# Patient Record
Sex: MALE | Race: WHITE | NOT HISPANIC OR LATINO | Employment: OTHER | URBAN - METROPOLITAN AREA
[De-identification: names, ages, dates, MRNs, and addresses within clinical notes are randomized per-mention and may not be internally consistent; named-entity substitution may affect disease eponyms.]

---

## 2017-06-04 ENCOUNTER — RESOLUTE PROFESSIONAL BILLING HOSPITAL PROF FEE (OUTPATIENT)
Dept: MEDSURG UNIT | Facility: MEDICAL CENTER | Age: 72
End: 2017-06-04
Payer: MEDICARE

## 2017-06-04 ENCOUNTER — APPOINTMENT (OUTPATIENT)
Dept: RADIOLOGY | Facility: MEDICAL CENTER | Age: 72
DRG: 896 | End: 2017-06-04
Attending: EMERGENCY MEDICINE
Payer: MEDICARE

## 2017-06-04 ENCOUNTER — APPOINTMENT (OUTPATIENT)
Dept: RADIOLOGY | Facility: MEDICAL CENTER | Age: 72
DRG: 896 | End: 2017-06-04
Attending: INTERNAL MEDICINE
Payer: MEDICARE

## 2017-06-04 ENCOUNTER — HOSPITAL ENCOUNTER (INPATIENT)
Facility: MEDICAL CENTER | Age: 72
LOS: 2 days | DRG: 896 | End: 2017-06-07
Attending: EMERGENCY MEDICINE | Admitting: INTERNAL MEDICINE
Payer: MEDICARE

## 2017-06-04 DIAGNOSIS — F10.920 ALCOHOL INTOXICATION, UNCOMPLICATED (HCC): ICD-10-CM

## 2017-06-04 DIAGNOSIS — R07.9 CHEST PAIN, UNSPECIFIED TYPE: ICD-10-CM

## 2017-06-04 DIAGNOSIS — F41.9 ANXIETY: ICD-10-CM

## 2017-06-04 DIAGNOSIS — E86.0 DEHYDRATION: ICD-10-CM

## 2017-06-04 LAB
ABO GROUP BLD: NORMAL
ALBUMIN SERPL BCP-MCNC: 4.2 G/DL (ref 3.2–4.9)
ALBUMIN/GLOB SERPL: 1.4 G/DL
ALP SERPL-CCNC: 68 U/L (ref 30–99)
ALT SERPL-CCNC: 15 U/L (ref 2–50)
ANION GAP SERPL CALC-SCNC: 20 MMOL/L (ref 0–11.9)
APPEARANCE UR: CLEAR
AST SERPL-CCNC: 20 U/L (ref 12–45)
BASOPHILS # BLD AUTO: 0.4 % (ref 0–1.8)
BASOPHILS # BLD: 0.08 K/UL (ref 0–0.12)
BILIRUB SERPL-MCNC: 0.6 MG/DL (ref 0.1–1.5)
BILIRUB UR QL STRIP.AUTO: NEGATIVE
BLD GP AB SCN SERPL QL: NORMAL
BUN SERPL-MCNC: 22 MG/DL (ref 8–22)
CALCIUM SERPL-MCNC: 9.2 MG/DL (ref 8.5–10.5)
CHLORIDE SERPL-SCNC: 103 MMOL/L (ref 96–112)
CO2 SERPL-SCNC: 15 MMOL/L (ref 20–33)
COLOR UR: YELLOW
CREAT SERPL-MCNC: 0.99 MG/DL (ref 0.5–1.4)
EKG IMPRESSION: NORMAL
EOSINOPHIL # BLD AUTO: 0 K/UL (ref 0–0.51)
EOSINOPHIL NFR BLD: 0 % (ref 0–6.9)
ERYTHROCYTE [DISTWIDTH] IN BLOOD BY AUTOMATED COUNT: 43.3 FL (ref 35.9–50)
ETHANOL BLD-MCNC: 0.19 G/DL
GFR SERPL CREATININE-BSD FRML MDRD: >60 ML/MIN/1.73 M 2
GLOBULIN SER CALC-MCNC: 3.1 G/DL (ref 1.9–3.5)
GLUCOSE SERPL-MCNC: 148 MG/DL (ref 65–99)
GLUCOSE UR STRIP.AUTO-MCNC: 70 MG/DL
HCT VFR BLD AUTO: 48.7 % (ref 42–52)
HGB BLD-MCNC: 16.5 G/DL (ref 14–18)
IMM GRANULOCYTES # BLD AUTO: 0.13 K/UL (ref 0–0.11)
IMM GRANULOCYTES NFR BLD AUTO: 0.6 % (ref 0–0.9)
KETONES UR STRIP.AUTO-MCNC: 60 MG/DL
LEUKOCYTE ESTERASE UR QL STRIP.AUTO: NEGATIVE
LYMPHOCYTES # BLD AUTO: 3.12 K/UL (ref 1–4.8)
LYMPHOCYTES NFR BLD: 14.6 % (ref 22–41)
MCH RBC QN AUTO: 29 PG (ref 27–33)
MCHC RBC AUTO-ENTMCNC: 33.9 G/DL (ref 33.7–35.3)
MCV RBC AUTO: 85.7 FL (ref 81.4–97.8)
MICRO URNS: ABNORMAL
MONOCYTES # BLD AUTO: 0.97 K/UL (ref 0–0.85)
MONOCYTES NFR BLD AUTO: 4.5 % (ref 0–13.4)
MUCOUS THREADS #/AREA URNS HPF: ABNORMAL /HPF
NEUTROPHILS # BLD AUTO: 17.08 K/UL (ref 1.82–7.42)
NEUTROPHILS NFR BLD: 79.9 % (ref 44–72)
NITRITE UR QL STRIP.AUTO: NEGATIVE
NRBC # BLD AUTO: 0 K/UL
NRBC BLD AUTO-RTO: 0 /100 WBC
PH UR STRIP.AUTO: 5.5 [PH]
PLATELET # BLD AUTO: 407 K/UL (ref 164–446)
PMV BLD AUTO: 9.7 FL (ref 9–12.9)
POTASSIUM SERPL-SCNC: 3.7 MMOL/L (ref 3.6–5.5)
PROT SERPL-MCNC: 7.3 G/DL (ref 6–8.2)
PROT UR QL STRIP: 30 MG/DL
RBC # BLD AUTO: 5.68 M/UL (ref 4.7–6.1)
RBC # URNS HPF: ABNORMAL /HPF
RBC UR QL AUTO: ABNORMAL
RH BLD: NORMAL
SODIUM SERPL-SCNC: 138 MMOL/L (ref 135–145)
SP GR UR STRIP.AUTO: 1.02
TROPONIN I SERPL-MCNC: <0.01 NG/ML (ref 0–0.04)
WBC # BLD AUTO: 21.4 K/UL (ref 4.8–10.8)
WBC #/AREA URNS HPF: ABNORMAL /HPF

## 2017-06-04 PROCEDURE — 84484 ASSAY OF TROPONIN QUANT: CPT

## 2017-06-04 PROCEDURE — 86901 BLOOD TYPING SEROLOGIC RH(D): CPT

## 2017-06-04 PROCEDURE — 0DJ08ZZ INSPECTION OF UPPER INTESTINAL TRACT, VIA NATURAL OR ARTIFICIAL OPENING ENDOSCOPIC: ICD-10-PCS | Performed by: INTERNAL MEDICINE

## 2017-06-04 PROCEDURE — 80053 COMPREHEN METABOLIC PANEL: CPT

## 2017-06-04 PROCEDURE — 94760 N-INVAS EAR/PLS OXIMETRY 1: CPT

## 2017-06-04 PROCEDURE — 86850 RBC ANTIBODY SCREEN: CPT

## 2017-06-04 PROCEDURE — 81001 URINALYSIS AUTO W/SCOPE: CPT

## 2017-06-04 PROCEDURE — 700102 HCHG RX REV CODE 250 W/ 637 OVERRIDE(OP): Performed by: STUDENT IN AN ORGANIZED HEALTH CARE EDUCATION/TRAINING PROGRAM

## 2017-06-04 PROCEDURE — A9270 NON-COVERED ITEM OR SERVICE: HCPCS | Performed by: EMERGENCY MEDICINE

## 2017-06-04 PROCEDURE — 700102 HCHG RX REV CODE 250 W/ 637 OVERRIDE(OP): Performed by: EMERGENCY MEDICINE

## 2017-06-04 PROCEDURE — 304562 HCHG STAT O2 MASK/CANNULA

## 2017-06-04 PROCEDURE — 96374 THER/PROPH/DIAG INJ IV PUSH: CPT

## 2017-06-04 PROCEDURE — 96361 HYDRATE IV INFUSION ADD-ON: CPT

## 2017-06-04 PROCEDURE — 700105 HCHG RX REV CODE 258: Performed by: EMERGENCY MEDICINE

## 2017-06-04 PROCEDURE — HZ2ZZZZ DETOXIFICATION SERVICES FOR SUBSTANCE ABUSE TREATMENT: ICD-10-PCS | Performed by: INTERNAL MEDICINE

## 2017-06-04 PROCEDURE — 700111 HCHG RX REV CODE 636 W/ 250 OVERRIDE (IP): Performed by: EMERGENCY MEDICINE

## 2017-06-04 PROCEDURE — 86900 BLOOD TYPING SEROLOGIC ABO: CPT

## 2017-06-04 PROCEDURE — A9270 NON-COVERED ITEM OR SERVICE: HCPCS | Performed by: STUDENT IN AN ORGANIZED HEALTH CARE EDUCATION/TRAINING PROGRAM

## 2017-06-04 PROCEDURE — 93005 ELECTROCARDIOGRAM TRACING: CPT

## 2017-06-04 PROCEDURE — 85025 COMPLETE CBC W/AUTO DIFF WBC: CPT

## 2017-06-04 PROCEDURE — G0378 HOSPITAL OBSERVATION PER HR: HCPCS

## 2017-06-04 PROCEDURE — 99285 EMERGENCY DEPT VISIT HI MDM: CPT

## 2017-06-04 PROCEDURE — 71010 DX-CHEST-PORTABLE (1 VIEW): CPT

## 2017-06-04 PROCEDURE — 80307 DRUG TEST PRSMV CHEM ANLYZR: CPT

## 2017-06-04 RX ORDER — LORAZEPAM 2 MG/ML
0.5 INJECTION INTRAMUSCULAR EVERY 4 HOURS PRN
Status: DISCONTINUED | OUTPATIENT
Start: 2017-06-04 | End: 2017-06-06

## 2017-06-04 RX ORDER — BISACODYL 10 MG
10 SUPPOSITORY, RECTAL RECTAL
Status: DISCONTINUED | OUTPATIENT
Start: 2017-06-04 | End: 2017-06-05

## 2017-06-04 RX ORDER — FOLIC ACID 1 MG/1
1 TABLET ORAL DAILY
Status: DISCONTINUED | OUTPATIENT
Start: 2017-06-05 | End: 2017-06-07 | Stop reason: HOSPADM

## 2017-06-04 RX ORDER — DOCUSATE SODIUM 100 MG/1
100 CAPSULE, LIQUID FILLED ORAL 2 TIMES DAILY
COMMUNITY

## 2017-06-04 RX ORDER — PYRIDOXINE HCL (VITAMIN B6) 100 MG
65 TABLET ORAL DAILY
Status: DISCONTINUED | OUTPATIENT
Start: 2017-06-05 | End: 2017-06-04

## 2017-06-04 RX ORDER — ALBUTEROL SULFATE 90 UG/1
2 AEROSOL, METERED RESPIRATORY (INHALATION) EVERY 4 HOURS PRN
Status: DISCONTINUED | OUTPATIENT
Start: 2017-06-04 | End: 2017-06-07 | Stop reason: HOSPADM

## 2017-06-04 RX ORDER — PANTOPRAZOLE SODIUM 40 MG/10ML
80 INJECTION, POWDER, LYOPHILIZED, FOR SOLUTION INTRAVENOUS ONCE
Status: DISCONTINUED | OUTPATIENT
Start: 2017-06-04 | End: 2017-06-04

## 2017-06-04 RX ORDER — GABAPENTIN 300 MG/1
300 CAPSULE ORAL 4 TIMES DAILY
Status: DISCONTINUED | OUTPATIENT
Start: 2017-06-04 | End: 2017-06-06

## 2017-06-04 RX ORDER — LORAZEPAM 1 MG/1
2 TABLET ORAL
Status: DISCONTINUED | OUTPATIENT
Start: 2017-06-04 | End: 2017-06-06

## 2017-06-04 RX ORDER — SODIUM CHLORIDE 9 MG/ML
1000 INJECTION, SOLUTION INTRAVENOUS ONCE
Status: COMPLETED | OUTPATIENT
Start: 2017-06-04 | End: 2017-06-04

## 2017-06-04 RX ORDER — CITALOPRAM 40 MG/1
40 TABLET ORAL DAILY
Status: DISCONTINUED | OUTPATIENT
Start: 2017-06-05 | End: 2017-06-05

## 2017-06-04 RX ORDER — LORAZEPAM 2 MG/ML
1.5 INJECTION INTRAMUSCULAR
Status: DISCONTINUED | OUTPATIENT
Start: 2017-06-04 | End: 2017-06-06

## 2017-06-04 RX ORDER — OMEPRAZOLE 20 MG/1
20 CAPSULE, DELAYED RELEASE ORAL
Status: DISCONTINUED | OUTPATIENT
Start: 2017-06-05 | End: 2017-06-04

## 2017-06-04 RX ORDER — LORAZEPAM 2 MG/ML
2 INJECTION INTRAMUSCULAR ONCE
Status: COMPLETED | OUTPATIENT
Start: 2017-06-04 | End: 2017-06-04

## 2017-06-04 RX ORDER — THIAMINE MONONITRATE (VIT B1) 100 MG
100 TABLET ORAL DAILY
Status: DISCONTINUED | OUTPATIENT
Start: 2017-06-05 | End: 2017-06-07 | Stop reason: HOSPADM

## 2017-06-04 RX ORDER — PANTOPRAZOLE SODIUM 40 MG/10ML
40 INJECTION, POWDER, LYOPHILIZED, FOR SOLUTION INTRAVENOUS DAILY
Status: DISCONTINUED | OUTPATIENT
Start: 2017-06-05 | End: 2017-06-05

## 2017-06-04 RX ORDER — NITROGLYCERIN 0.4 MG/1
0.4 TABLET SUBLINGUAL
Status: DISCONTINUED | OUTPATIENT
Start: 2017-06-04 | End: 2017-06-07 | Stop reason: HOSPADM

## 2017-06-04 RX ORDER — SODIUM CHLORIDE 9 MG/ML
INJECTION, SOLUTION INTRAVENOUS CONTINUOUS
Status: DISCONTINUED | OUTPATIENT
Start: 2017-06-04 | End: 2017-06-07 | Stop reason: HOSPADM

## 2017-06-04 RX ORDER — LORAZEPAM 2 MG/ML
2 INJECTION INTRAMUSCULAR
Status: DISCONTINUED | OUTPATIENT
Start: 2017-06-04 | End: 2017-06-06

## 2017-06-04 RX ORDER — LABETALOL HYDROCHLORIDE 5 MG/ML
10 INJECTION, SOLUTION INTRAVENOUS EVERY 4 HOURS PRN
Status: DISCONTINUED | OUTPATIENT
Start: 2017-06-04 | End: 2017-06-07 | Stop reason: HOSPADM

## 2017-06-04 RX ORDER — LORAZEPAM 1 MG/1
1 TABLET ORAL EVERY 4 HOURS PRN
Status: DISCONTINUED | OUTPATIENT
Start: 2017-06-04 | End: 2017-06-06

## 2017-06-04 RX ORDER — LORAZEPAM 1 MG/1
3 TABLET ORAL
Status: DISCONTINUED | OUTPATIENT
Start: 2017-06-04 | End: 2017-06-06

## 2017-06-04 RX ORDER — AMOXICILLIN 250 MG
2 CAPSULE ORAL 2 TIMES DAILY
Status: DISCONTINUED | OUTPATIENT
Start: 2017-06-04 | End: 2017-06-05

## 2017-06-04 RX ORDER — ACETAMINOPHEN 325 MG/1
650 TABLET ORAL EVERY 6 HOURS PRN
Status: DISCONTINUED | OUTPATIENT
Start: 2017-06-04 | End: 2017-06-07 | Stop reason: HOSPADM

## 2017-06-04 RX ORDER — LORAZEPAM 0.5 MG/1
0.5 TABLET ORAL EVERY 4 HOURS PRN
Status: DISCONTINUED | OUTPATIENT
Start: 2017-06-04 | End: 2017-06-06

## 2017-06-04 RX ORDER — ATORVASTATIN CALCIUM 40 MG/1
40 TABLET, FILM COATED ORAL ONCE
Status: DISCONTINUED | OUTPATIENT
Start: 2017-06-04 | End: 2017-06-05

## 2017-06-04 RX ORDER — LORAZEPAM 2 MG/ML
1 INJECTION INTRAMUSCULAR
Status: DISCONTINUED | OUTPATIENT
Start: 2017-06-04 | End: 2017-06-06

## 2017-06-04 RX ORDER — ALBUTEROL SULFATE 90 UG/1
2 AEROSOL, METERED RESPIRATORY (INHALATION) EVERY 4 HOURS PRN
COMMUNITY
End: 2018-06-11

## 2017-06-04 RX ORDER — LORAZEPAM 1 MG/1
4 TABLET ORAL
Status: DISCONTINUED | OUTPATIENT
Start: 2017-06-04 | End: 2017-06-06

## 2017-06-04 RX ORDER — POLYETHYLENE GLYCOL 3350 17 G/17G
1 POWDER, FOR SOLUTION ORAL
Status: DISCONTINUED | OUTPATIENT
Start: 2017-06-04 | End: 2017-06-05

## 2017-06-04 RX ADMIN — SODIUM CHLORIDE 1000 ML: 9 INJECTION, SOLUTION INTRAVENOUS at 20:40

## 2017-06-04 RX ADMIN — LIDOCAINE HYDROCHLORIDE 30 ML: 20 SOLUTION OROPHARYNGEAL at 20:33

## 2017-06-04 RX ADMIN — LORAZEPAM 1 MG: 1 TABLET ORAL at 23:41

## 2017-06-04 RX ADMIN — LORAZEPAM 2 MG: 2 INJECTION INTRAMUSCULAR at 20:33

## 2017-06-04 RX ADMIN — GABAPENTIN 300 MG: 300 CAPSULE ORAL at 23:41

## 2017-06-04 ASSESSMENT — LIFESTYLE VARIABLES
EVER HAD A DRINK FIRST THING IN THE MORNING TO STEADY YOUR NERVES TO GET RID OF A HANGOVER: NO
SUBSTANCE_ABUSE: 0
AGITATION: MODERATELY FIDGETY AND RESTLESS
DO YOU DRINK ALCOHOL: YES
TREMOR: *
VISUAL DISTURBANCES: NOT PRESENT
TOTAL SCORE: 3
CONSUMPTION TOTAL: POSITIVE
NAUSEA AND VOMITING: NO NAUSEA AND NO VOMITING
EVER FELT BAD OR GUILTY ABOUT YOUR DRINKING: YES
HAVE PEOPLE ANNOYED YOU BY CRITICIZING YOUR DRINKING: YES
ORIENTATION AND CLOUDING OF SENSORIUM: ORIENTED AND CAN DO SERIAL ADDITIONS
TOTAL SCORE: 3
DOES PATIENT WANT TO STOP DRINKING: YES
AVERAGE NUMBER OF DAYS PER WEEK YOU HAVE A DRINK CONTAINING ALCOHOL: 3
ON A TYPICAL DAY WHEN YOU DRINK ALCOHOL HOW MANY DRINKS DO YOU HAVE: 5
TOTAL SCORE: 10
DOES PATIENT WANT TO TALK TO SOMEONE ABOUT QUITTING: YES
HEADACHE, FULLNESS IN HEAD: NOT PRESENT
PAROXYSMAL SWEATS: NO SWEAT VISIBLE
HOW MANY TIMES IN THE PAST YEAR HAVE YOU HAD 5 OR MORE DRINKS IN A DAY: 5
TOTAL SCORE: 3
ANXIETY: MODERATELY ANXIOUS OR GUARDED, SO ANXIETY IS INFERRED
HAVE YOU EVER FELT YOU SHOULD CUT DOWN ON YOUR DRINKING: YES
AUDITORY DISTURBANCES: NOT PRESENT

## 2017-06-04 ASSESSMENT — ENCOUNTER SYMPTOMS
ABDOMINAL PAIN: 1
VOMITING: 0
PND: 0
DEPRESSION: 0
CLAUDICATION: 0
MYALGIAS: 0
NAUSEA: 1
PHOTOPHOBIA: 0
SPUTUM PRODUCTION: 0
FEVER: 0
CHILLS: 0
HEMOPTYSIS: 0
BLOOD IN STOOL: 0
COUGH: 0
HEADACHES: 1
BLURRED VISION: 0
BACK PAIN: 1
TINGLING: 0
ORTHOPNEA: 0
NECK PAIN: 0
TREMORS: 0
LOSS OF CONSCIOUSNESS: 0
DOUBLE VISION: 0
SPEECH CHANGE: 0
WEIGHT LOSS: 0
DIZZINESS: 0
SEIZURES: 0
DIARRHEA: 1
PALPITATIONS: 0
CONSTIPATION: 0
HEARTBURN: 1
SHORTNESS OF BREATH: 1
HALLUCINATIONS: 0
SENSORY CHANGE: 0
FOCAL WEAKNESS: 0

## 2017-06-04 ASSESSMENT — PAIN SCALES - GENERAL: PAINLEVEL_OUTOF10: 10

## 2017-06-05 ENCOUNTER — APPOINTMENT (OUTPATIENT)
Dept: RADIOLOGY | Facility: MEDICAL CENTER | Age: 72
DRG: 896 | End: 2017-06-05
Attending: EMERGENCY MEDICINE
Payer: MEDICARE

## 2017-06-05 LAB
ABO GROUP BLD: NORMAL
ALBUMIN SERPL BCP-MCNC: 4.4 G/DL (ref 3.2–4.9)
ALBUMIN/GLOB SERPL: 1.3 G/DL
ALP SERPL-CCNC: 72 U/L (ref 30–99)
ALT SERPL-CCNC: 16 U/L (ref 2–50)
AMPHET UR QL SCN: NEGATIVE
ANION GAP SERPL CALC-SCNC: 15 MMOL/L (ref 0–11.9)
APAP SERPL-MCNC: <10 UG/ML (ref 10–30)
AST SERPL-CCNC: 21 U/L (ref 12–45)
BARBITURATES UR QL SCN: NEGATIVE
BASOPHILS # BLD AUTO: 0.5 % (ref 0–1.8)
BASOPHILS # BLD: 0.09 K/UL (ref 0–0.12)
BENZODIAZ UR QL SCN: NEGATIVE
BILIRUB SERPL-MCNC: 0.8 MG/DL (ref 0.1–1.5)
BUN SERPL-MCNC: 21 MG/DL (ref 8–22)
BZE UR QL SCN: NEGATIVE
C DIFF DNA SPEC QL NAA+PROBE: NEGATIVE
C DIFF TOX A+B STL QL IA: NEGATIVE
C DIFF TOX GENS STL QL NAA+PROBE: NEGATIVE
CALCIUM SERPL-MCNC: 9.2 MG/DL (ref 8.5–10.5)
CANNABINOIDS UR QL SCN: NEGATIVE
CHLORIDE SERPL-SCNC: 104 MMOL/L (ref 96–112)
CO2 SERPL-SCNC: 19 MMOL/L (ref 20–33)
CREAT SERPL-MCNC: 0.81 MG/DL (ref 0.5–1.4)
EKG IMPRESSION: NORMAL
EOSINOPHIL # BLD AUTO: 0.02 K/UL (ref 0–0.51)
EOSINOPHIL NFR BLD: 0.1 % (ref 0–6.9)
ERYTHROCYTE [DISTWIDTH] IN BLOOD BY AUTOMATED COUNT: 44.3 FL (ref 35.9–50)
GFR SERPL CREATININE-BSD FRML MDRD: >60 ML/MIN/1.73 M 2
GLOBULIN SER CALC-MCNC: 3.3 G/DL (ref 1.9–3.5)
GLUCOSE SERPL-MCNC: 80 MG/DL (ref 65–99)
HCT VFR BLD AUTO: 48.6 % (ref 42–52)
HGB BLD-MCNC: 16.5 G/DL (ref 14–18)
IMM GRANULOCYTES # BLD AUTO: 0.06 K/UL (ref 0–0.11)
IMM GRANULOCYTES NFR BLD AUTO: 0.3 % (ref 0–0.9)
INR PPP: 1.05 (ref 0.87–1.13)
LACTATE BLD-SCNC: 1.7 MMOL/L (ref 0.5–2)
LACTATE BLD-SCNC: 5.5 MMOL/L (ref 0.5–2)
LIPASE SERPL-CCNC: 20 U/L (ref 11–82)
LYMPHOCYTES # BLD AUTO: 3.57 K/UL (ref 1–4.8)
LYMPHOCYTES NFR BLD: 19.3 % (ref 22–41)
MAGNESIUM SERPL-MCNC: 1.9 MG/DL (ref 1.5–2.5)
MCH RBC QN AUTO: 29.2 PG (ref 27–33)
MCHC RBC AUTO-ENTMCNC: 34 G/DL (ref 33.7–35.3)
MCV RBC AUTO: 86 FL (ref 81.4–97.8)
MDMA UR QL SCN: NEGATIVE
METHADONE UR QL SCN: NEGATIVE
MONOCYTES # BLD AUTO: 1.13 K/UL (ref 0–0.85)
MONOCYTES NFR BLD AUTO: 6.1 % (ref 0–13.4)
NEUTROPHILS # BLD AUTO: 13.64 K/UL (ref 1.82–7.42)
NEUTROPHILS NFR BLD: 73.7 % (ref 44–72)
NRBC # BLD AUTO: 0 K/UL
NRBC BLD AUTO-RTO: 0 /100 WBC
OPIATES UR QL SCN: NEGATIVE
OXYCODONE UR QL SCN: NEGATIVE
PCP UR QL SCN: NEGATIVE
PLATELET # BLD AUTO: 395 K/UL (ref 164–446)
PMV BLD AUTO: 9.5 FL (ref 9–12.9)
POTASSIUM SERPL-SCNC: 3.9 MMOL/L (ref 3.6–5.5)
PROCALCITONIN SERPL-MCNC: <0.05 NG/ML
PROPOXYPH UR QL SCN: NEGATIVE
PROT SERPL-MCNC: 7.7 G/DL (ref 6–8.2)
PROTHROMBIN TIME: 14 SEC (ref 12–14.6)
RBC # BLD AUTO: 5.65 M/UL (ref 4.7–6.1)
SALICYLATES SERPL-MCNC: 0 MG/DL (ref 15–25)
SODIUM SERPL-SCNC: 138 MMOL/L (ref 135–145)
TROPONIN I SERPL-MCNC: <0.01 NG/ML (ref 0–0.04)
TROPONIN I SERPL-MCNC: <0.01 NG/ML (ref 0–0.04)
WBC # BLD AUTO: 18.5 K/UL (ref 4.8–10.8)
WBC STL QL MICRO: NORMAL

## 2017-06-05 PROCEDURE — 93005 ELECTROCARDIOGRAM TRACING: CPT | Performed by: STUDENT IN AN ORGANIZED HEALTH CARE EDUCATION/TRAINING PROGRAM

## 2017-06-05 PROCEDURE — 83735 ASSAY OF MAGNESIUM: CPT

## 2017-06-05 PROCEDURE — 87324 CLOSTRIDIUM AG IA: CPT

## 2017-06-05 PROCEDURE — 700102 HCHG RX REV CODE 250 W/ 637 OVERRIDE(OP): Performed by: STUDENT IN AN ORGANIZED HEALTH CARE EDUCATION/TRAINING PROGRAM

## 2017-06-05 PROCEDURE — 770020 HCHG ROOM/CARE - TELE (206)

## 2017-06-05 PROCEDURE — 84145 PROCALCITONIN (PCT): CPT

## 2017-06-05 PROCEDURE — A9270 NON-COVERED ITEM OR SERVICE: HCPCS | Performed by: STUDENT IN AN ORGANIZED HEALTH CARE EDUCATION/TRAINING PROGRAM

## 2017-06-05 PROCEDURE — 700101 HCHG RX REV CODE 250: Performed by: INTERNAL MEDICINE

## 2017-06-05 PROCEDURE — 36415 COLL VENOUS BLD VENIPUNCTURE: CPT

## 2017-06-05 PROCEDURE — 85025 COMPLETE CBC W/AUTO DIFF WBC: CPT

## 2017-06-05 PROCEDURE — 87493 C DIFF AMPLIFIED PROBE: CPT

## 2017-06-05 PROCEDURE — 700101 HCHG RX REV CODE 250: Performed by: STUDENT IN AN ORGANIZED HEALTH CARE EDUCATION/TRAINING PROGRAM

## 2017-06-05 PROCEDURE — 80307 DRUG TEST PRSMV CHEM ANLYZR: CPT

## 2017-06-05 PROCEDURE — 87040 BLOOD CULTURE FOR BACTERIA: CPT | Mod: 91

## 2017-06-05 PROCEDURE — 700111 HCHG RX REV CODE 636 W/ 250 OVERRIDE (IP): Performed by: STUDENT IN AN ORGANIZED HEALTH CARE EDUCATION/TRAINING PROGRAM

## 2017-06-05 PROCEDURE — 84484 ASSAY OF TROPONIN QUANT: CPT

## 2017-06-05 PROCEDURE — A9270 NON-COVERED ITEM OR SERVICE: HCPCS | Performed by: INTERNAL MEDICINE

## 2017-06-05 PROCEDURE — 85610 PROTHROMBIN TIME: CPT

## 2017-06-05 PROCEDURE — 700102 HCHG RX REV CODE 250 W/ 637 OVERRIDE(OP): Performed by: INTERNAL MEDICINE

## 2017-06-05 PROCEDURE — 74177 CT ABD & PELVIS W/CONTRAST: CPT

## 2017-06-05 PROCEDURE — 99223 1ST HOSP IP/OBS HIGH 75: CPT | Mod: AI,GC | Performed by: INTERNAL MEDICINE

## 2017-06-05 PROCEDURE — 89055 LEUKOCYTE ASSESSMENT FECAL: CPT

## 2017-06-05 PROCEDURE — 83630 LACTOFERRIN FECAL (QUAL): CPT

## 2017-06-05 PROCEDURE — C9113 INJ PANTOPRAZOLE SODIUM, VIA: HCPCS | Performed by: STUDENT IN AN ORGANIZED HEALTH CARE EDUCATION/TRAINING PROGRAM

## 2017-06-05 PROCEDURE — 700111 HCHG RX REV CODE 636 W/ 250 OVERRIDE (IP): Performed by: INTERNAL MEDICINE

## 2017-06-05 PROCEDURE — 83605 ASSAY OF LACTIC ACID: CPT

## 2017-06-05 PROCEDURE — 93010 ELECTROCARDIOGRAM REPORT: CPT | Performed by: INTERNAL MEDICINE

## 2017-06-05 PROCEDURE — 700105 HCHG RX REV CODE 258: Performed by: INTERNAL MEDICINE

## 2017-06-05 PROCEDURE — 700117 HCHG RX CONTRAST REV CODE 255: Performed by: EMERGENCY MEDICINE

## 2017-06-05 PROCEDURE — 94760 N-INVAS EAR/PLS OXIMETRY 1: CPT

## 2017-06-05 PROCEDURE — 83690 ASSAY OF LIPASE: CPT

## 2017-06-05 PROCEDURE — 700105 HCHG RX REV CODE 258: Performed by: STUDENT IN AN ORGANIZED HEALTH CARE EDUCATION/TRAINING PROGRAM

## 2017-06-05 PROCEDURE — 80053 COMPREHEN METABOLIC PANEL: CPT

## 2017-06-05 RX ORDER — HYDROCODONE BITARTRATE AND ACETAMINOPHEN 10; 325 MG/1; MG/1
1 TABLET ORAL EVERY 6 HOURS PRN
Status: DISCONTINUED | OUTPATIENT
Start: 2017-06-05 | End: 2017-06-07 | Stop reason: HOSPADM

## 2017-06-05 RX ORDER — OMEPRAZOLE 20 MG/1
20 CAPSULE, DELAYED RELEASE ORAL DAILY
Status: DISCONTINUED | OUTPATIENT
Start: 2017-06-05 | End: 2017-06-06

## 2017-06-05 RX ORDER — QUETIAPINE FUMARATE 25 MG/1
25 TABLET, FILM COATED ORAL 3 TIMES DAILY
Status: DISCONTINUED | OUTPATIENT
Start: 2017-06-05 | End: 2017-06-06

## 2017-06-05 RX ORDER — SODIUM CHLORIDE 9 MG/ML
500 INJECTION, SOLUTION INTRAVENOUS ONCE
Status: COMPLETED | OUTPATIENT
Start: 2017-06-05 | End: 2017-06-05

## 2017-06-05 RX ORDER — QUETIAPINE FUMARATE 25 MG/1
25 TABLET, FILM COATED ORAL 3 TIMES DAILY
Status: DISCONTINUED | OUTPATIENT
Start: 2017-06-05 | End: 2017-06-05

## 2017-06-05 RX ADMIN — LORAZEPAM 1.5 MG: 2 INJECTION INTRAMUSCULAR at 10:08

## 2017-06-05 RX ADMIN — LORAZEPAM 0.5 MG: 0.5 TABLET ORAL at 04:46

## 2017-06-05 RX ADMIN — ACETAMINOPHEN 650 MG: 325 TABLET, FILM COATED ORAL at 10:01

## 2017-06-05 RX ADMIN — CITALOPRAM HYDROBROMIDE 40 MG: 40 TABLET ORAL at 08:05

## 2017-06-05 RX ADMIN — PANTOPRAZOLE SODIUM 40 MG: 40 INJECTION, POWDER, FOR SOLUTION INTRAVENOUS at 11:21

## 2017-06-05 RX ADMIN — LORAZEPAM 1 MG: 1 TABLET ORAL at 22:38

## 2017-06-05 RX ADMIN — GABAPENTIN 300 MG: 300 CAPSULE ORAL at 18:15

## 2017-06-05 RX ADMIN — SODIUM CHLORIDE: 9 INJECTION, SOLUTION INTRAVENOUS at 01:59

## 2017-06-05 RX ADMIN — LORAZEPAM 1 MG: 2 INJECTION INTRAMUSCULAR at 20:18

## 2017-06-05 RX ADMIN — LORAZEPAM 1 MG: 2 INJECTION INTRAMUSCULAR at 08:07

## 2017-06-05 RX ADMIN — SODIUM CHLORIDE: 9 INJECTION, SOLUTION INTRAVENOUS at 13:19

## 2017-06-05 RX ADMIN — SODIUM CHLORIDE: 9 INJECTION, SOLUTION INTRAVENOUS at 21:21

## 2017-06-05 RX ADMIN — METRONIDAZOLE 500 MG: 500 INJECTION, SOLUTION INTRAVENOUS at 04:41

## 2017-06-05 RX ADMIN — CEFTRIAXONE SODIUM 2 G: 2 INJECTION, POWDER, FOR SOLUTION INTRAMUSCULAR; INTRAVENOUS at 04:20

## 2017-06-05 RX ADMIN — GABAPENTIN 300 MG: 300 CAPSULE ORAL at 22:39

## 2017-06-05 RX ADMIN — LORAZEPAM 1 MG: 2 INJECTION INTRAMUSCULAR at 13:12

## 2017-06-05 RX ADMIN — IOHEXOL 100 ML: 350 INJECTION, SOLUTION INTRAVENOUS at 00:59

## 2017-06-05 RX ADMIN — HYDROCODONE BITARTRATE AND ACETAMINOPHEN 1 TABLET: 10; 325 TABLET ORAL at 18:16

## 2017-06-05 RX ADMIN — OMEPRAZOLE 20 MG: 20 CAPSULE, DELAYED RELEASE ORAL at 15:38

## 2017-06-05 RX ADMIN — SODIUM CHLORIDE 80 MG: 9 INJECTION, SOLUTION INTRAVENOUS at 02:07

## 2017-06-05 RX ADMIN — QUETIAPINE FUMARATE 25 MG: 25 TABLET ORAL at 22:30

## 2017-06-05 RX ADMIN — FOLIC ACID: 5 INJECTION, SOLUTION INTRAMUSCULAR; INTRAVENOUS; SUBCUTANEOUS at 04:06

## 2017-06-05 RX ADMIN — LORAZEPAM 1 MG: 2 INJECTION INTRAMUSCULAR at 15:38

## 2017-06-05 RX ADMIN — GABAPENTIN 300 MG: 300 CAPSULE ORAL at 04:20

## 2017-06-05 RX ADMIN — Medication 100 MG: at 22:39

## 2017-06-05 RX ADMIN — THERA TABS 1 TABLET: TAB at 22:39

## 2017-06-05 RX ADMIN — LORAZEPAM 1 MG: 2 INJECTION INTRAMUSCULAR at 18:15

## 2017-06-05 RX ADMIN — HYDROCODONE BITARTRATE AND ACETAMINOPHEN 1 TABLET: 10; 325 TABLET ORAL at 11:21

## 2017-06-05 RX ADMIN — FOLIC ACID 1 MG: 1 TABLET ORAL at 22:39

## 2017-06-05 RX ADMIN — GABAPENTIN 300 MG: 300 CAPSULE ORAL at 11:21

## 2017-06-05 RX ADMIN — LORAZEPAM: 2 INJECTION INTRAMUSCULAR; INTRAVENOUS at 20:38

## 2017-06-05 RX ADMIN — SODIUM CHLORIDE 500 ML: 9 INJECTION, SOLUTION INTRAVENOUS at 02:10

## 2017-06-05 ASSESSMENT — LIFESTYLE VARIABLES
TOTAL SCORE: 12
NAUSEA AND VOMITING: NO NAUSEA AND NO VOMITING
PAROXYSMAL SWEATS: BARELY PERCEPTIBLE SWEATING. PALMS MOIST
NAUSEA AND VOMITING: NO NAUSEA AND NO VOMITING
TREMOR: *
HEADACHE, FULLNESS IN HEAD: NOT PRESENT
VISUAL DISTURBANCES: NOT PRESENT
PAROXYSMAL SWEATS: *
VISUAL DISTURBANCES: NOT PRESENT
VISUAL DISTURBANCES: NOT PRESENT
PAROXYSMAL SWEATS: NO SWEAT VISIBLE
ANXIETY: *
PAROXYSMAL SWEATS: BARELY PERCEPTIBLE SWEATING. PALMS MOIST
NAUSEA AND VOMITING: MILD NAUSEA WITH NO VOMITING
TREMOR: TREMOR NOT VISIBLE BUT CAN BE FELT, FINGERTIP TO FINGERTIP
PAROXYSMAL SWEATS: NO SWEAT VISIBLE
HEADACHE, FULLNESS IN HEAD: NOT PRESENT
TOTAL SCORE: 3
AUDITORY DISTURBANCES: NOT PRESENT
ANXIETY: MILDLY ANXIOUS
TOTAL SCORE: 11
EVER_SMOKED: YES
ORIENTATION AND CLOUDING OF SENSORIUM: CANNOT DO SERIAL ADDITIONS OR IS UNCERTAIN ABOUT DATE
AUDITORY DISTURBANCES: NOT PRESENT
NAUSEA AND VOMITING: NO NAUSEA AND NO VOMITING
NAUSEA AND VOMITING: NO NAUSEA AND NO VOMITING
AGITATION: MODERATELY FIDGETY AND RESTLESS
PAROXYSMAL SWEATS: BARELY PERCEPTIBLE SWEATING. PALMS MOIST
TREMOR: *
TREMOR: TREMOR NOT VISIBLE BUT CAN BE FELT, FINGERTIP TO FINGERTIP
NAUSEA AND VOMITING: NO NAUSEA AND NO VOMITING
VISUAL DISTURBANCES: NOT PRESENT
NAUSEA AND VOMITING: NO NAUSEA AND NO VOMITING
ANXIETY: *
ORIENTATION AND CLOUDING OF SENSORIUM: ORIENTED AND CAN DO SERIAL ADDITIONS
HEADACHE, FULLNESS IN HEAD: NOT PRESENT
TOTAL SCORE: 9
HEADACHE, FULLNESS IN HEAD: NOT PRESENT
TOTAL SCORE: 5
HEADACHE, FULLNESS IN HEAD: NOT PRESENT
AGITATION: MODERATELY FIDGETY AND RESTLESS
ORIENTATION AND CLOUDING OF SENSORIUM: ORIENTED AND CAN DO SERIAL ADDITIONS
AGITATION: SOMEWHAT MORE THAN NORMAL ACTIVITY
AUDITORY DISTURBANCES: NOT PRESENT
AUDITORY DISTURBANCES: NOT PRESENT
TOTAL SCORE: 16
ORIENTATION AND CLOUDING OF SENSORIUM: CANNOT DO SERIAL ADDITIONS OR IS UNCERTAIN ABOUT DATE
TREMOR: TREMOR NOT VISIBLE BUT CAN BE FELT, FINGERTIP TO FINGERTIP
VISUAL DISTURBANCES: NOT PRESENT
VISUAL DISTURBANCES: NOT PRESENT
AUDITORY DISTURBANCES: NOT PRESENT
AGITATION: MODERATELY FIDGETY AND RESTLESS
VISUAL DISTURBANCES: NOT PRESENT
HEADACHE, FULLNESS IN HEAD: NOT PRESENT
PAROXYSMAL SWEATS: NO SWEAT VISIBLE
TREMOR: *
ORIENTATION AND CLOUDING OF SENSORIUM: ORIENTED AND CAN DO SERIAL ADDITIONS
ORIENTATION AND CLOUDING OF SENSORIUM: ORIENTED AND CAN DO SERIAL ADDITIONS
TREMOR: *
ANXIETY: *
VISUAL DISTURBANCES: NOT PRESENT
AUDITORY DISTURBANCES: NOT PRESENT
TOTAL SCORE: 3
TACTILE DISTURBANCES: MODERATE ITCHING, PINS AND NEEDLES SENSATION, BURNING OR NUMBNESS
VISUAL DISTURBANCES: NOT PRESENT
ANXIETY: *
PAROXYSMAL SWEATS: BARELY PERCEPTIBLE SWEATING. PALMS MOIST
TOTAL SCORE: 11
ORIENTATION AND CLOUDING OF SENSORIUM: CANNOT DO SERIAL ADDITIONS OR IS UNCERTAIN ABOUT DATE
ANXIETY: MODERATELY ANXIOUS OR GUARDED, SO ANXIETY IS INFERRED
HEADACHE, FULLNESS IN HEAD: NOT PRESENT
HEADACHE, FULLNESS IN HEAD: NOT PRESENT
TOTAL SCORE: 11
HEADACHE, FULLNESS IN HEAD: NOT PRESENT
ANXIETY: MODERATELY ANXIOUS OR GUARDED, SO ANXIETY IS INFERRED
NAUSEA AND VOMITING: NO NAUSEA AND NO VOMITING
AUDITORY DISTURBANCES: NOT PRESENT
ORIENTATION AND CLOUDING OF SENSORIUM: ORIENTED AND CAN DO SERIAL ADDITIONS
EVER_SMOKED: YES
TREMOR: *
AGITATION: NORMAL ACTIVITY
AGITATION: MODERATELY FIDGETY AND RESTLESS
AUDITORY DISTURBANCES: NOT PRESENT
ANXIETY: MODERATELY ANXIOUS OR GUARDED, SO ANXIETY IS INFERRED
AGITATION: SOMEWHAT MORE THAN NORMAL ACTIVITY
ORIENTATION AND CLOUDING OF SENSORIUM: ORIENTED AND CAN DO SERIAL ADDITIONS
TREMOR: *
AGITATION: NORMAL ACTIVITY
PAROXYSMAL SWEATS: BARELY PERCEPTIBLE SWEATING. PALMS MOIST
NAUSEA AND VOMITING: NO NAUSEA AND NO VOMITING
ANXIETY: NO ANXIETY (AT EASE)
AUDITORY DISTURBANCES: NOT PRESENT
AGITATION: MODERATELY FIDGETY AND RESTLESS

## 2017-06-05 ASSESSMENT — ENCOUNTER SYMPTOMS
FOCAL WEAKNESS: 0
HEARTBURN: 1
SHORTNESS OF BREATH: 0
PSYCHIATRIC NEGATIVE: 1
WHEEZING: 0
VOMITING: 0
EYE PAIN: 0
BLURRED VISION: 0
COUGH: 0
SENSORY CHANGE: 0
FEVER: 0
CONSTIPATION: 0
DEPRESSION: 0
EYE REDNESS: 0
SORE THROAT: 0
NAUSEA: 0
DIZZINESS: 0
HEADACHES: 0
BACK PAIN: 1
PALPITATIONS: 0
CHILLS: 0
SEIZURES: 0
DIARRHEA: 1

## 2017-06-05 ASSESSMENT — COPD QUESTIONNAIRES
HAVE YOU SMOKED AT LEAST 100 CIGARETTES IN YOUR ENTIRE LIFE: YES
DO YOU EVER COUGH UP ANY MUCUS OR PHLEGM?: NO/ONLY WITH OCCASIONAL COLDS OR INFECTIONS
COPD SCREENING SCORE: 4
DURING THE PAST 4 WEEKS HOW MUCH DID YOU FEEL SHORT OF BREATH: NONE/LITTLE OF THE TIME

## 2017-06-05 ASSESSMENT — PAIN SCALES - GENERAL
PAINLEVEL_OUTOF10: 5
PAINLEVEL_OUTOF10: 0
PAINLEVEL_OUTOF10: 5

## 2017-06-05 NOTE — PROGRESS NOTES
2 RN skin assessment on admission done with warren; both ears, elbows, abdomen, back and heels intact.  Sacrum intact with reddened inner buttocks extending to gluteal minimus redness with barrier cream applied; and also right foot ingrown toenail with black eschar with pictures taken and will upload on patient's chart.

## 2017-06-05 NOTE — CARE PLAN
Problem: Nutritional:  Goal: Achieve adequate nutritional intake  Patient will consume 50% of meals  Outcome: NOT MET  R/t NPO

## 2017-06-05 NOTE — ASSESSMENT & PLAN NOTE
- Roughly 2-3 days prior to admission, consistent with prior ETOH binge periods  - Afebrile, procalcitonin < .05, C. Diff (-), NO hx of recent hospitalization, or abx use  - Likely non-bacterial and assc with his ETOH abuse  - Discont CTX/Flagyl; cont to closely monitor clinically

## 2017-06-05 NOTE — PROGRESS NOTES
Bedside report received. Patient A&O X 3, tele- SR,  3L NC,  Voids- Urinal, Activity- x1 assist, Diet- NPO. Complains of pain 5/10 medicated per MAR. POC discussed with patient. Pt verbalized understanding. Call light and belongings with in reach.  Bed locked and in lowest position, alarm and fall precautions in place.

## 2017-06-05 NOTE — ASSESSMENT & PLAN NOTE
- Anion gap on admission 20 >> 15 today  - Improving with continued IV hydration  - Likely ETOH related >> cont IVF hydration

## 2017-06-05 NOTE — PROGRESS NOTES
Patent 4 bottles of medications with temazepam empty bottle, citalopram, gabapentin and a bottle with one pill taken to pharmacy per policy.

## 2017-06-05 NOTE — ASSESSMENT & PLAN NOTE
- Patient presented on admission with non-specific abd pain worsened with laying flat  - CT abd/pel showed large hiatal hernia vs paraesophageal w/ aspect of non-obstructing volvulus  - Consulted GI (Dr. Stewart) >> 6/6 EGD Grade D severe reflux esophagitis from, several flat red spots in the ulceration but no bleeding.  Unable to assess Lawson's due to degree of inflammation, Giant hiatal hernia from 31-45cm, Duodenum WNL.  - No current intervention, per note from GI, pt to f/u as outpt in later July for gastroscopy

## 2017-06-05 NOTE — ED NOTES
Emily from Lab called with critical result of lactic at 5.5. Critical lab result read back to emily. Primary nurse made aware

## 2017-06-05 NOTE — ASSESSMENT & PLAN NOTE
- Long standing 20 year hx of routine binge drinking, +withdrawal, hx of intubation, NO seizures  - Current admission was s/p 2-3 days of heavy vodka abuse, BIB by EMS   - Admission labs: ETOH .49, Lactate 5.5, Utox (-), Na 34, K 3.7, AG 20 >> 15, BUN 22, Crt .99  - Seizure/Aspiration/Fall precauations, Full liquid diet  - IVF, CIWA, PO MV/Folate/Thiamine, PO Omeprazole 40mg QD

## 2017-06-05 NOTE — ASSESSMENT & PLAN NOTE
- Appears patient is more anxious as opposed to having true ETOH withdrawals   - Cont Citalopram; holding Temazepam while patient is on CIWA  - Psych (Dr. Santiago) consulted >> pending recs

## 2017-06-05 NOTE — PROGRESS NOTES
Patient received from ED on Tele accompanied by transporter; drowsy easily awakened; incontinent of urine and stool' Patient answers questions appropriately while awake; arrived with both hearing aids, a pair of shoes, sweat pants, red t shirt and a black duffle bag. He says he has contact lenses in. Patient has 2 peripheral IV sites in both antecubital.  IV of NS initiated at 125 ml/hr. Inner buttock reddened ; right foot 4th toe with ingrown toe nail. Moving all 4 extremities equally strong.

## 2017-06-05 NOTE — ED NOTES
Transport to nurses station.  Pt currently in CT.  Ticket to ride signed.  Pt belongings removed to room by transport to be taken to unit.

## 2017-06-05 NOTE — ED NOTES
Pt resting in bed.  VS stable.  NAD noted.  Respirations even and unlabored.  No questions or concerns to address at this time.
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
## 2017-06-05 NOTE — SENIOR ADMIT NOTE
Mr. Espinosa is a 71 y.o. male with PMHx of alcoholism, anxiety, GERD, Lawson's esophagus, COPD, Hx of GI bleed?, chronic back pain who presented with alcohol intoxication onset two days. Patient is a poor historian. Patient was sent to hospital because drinking too much ETOH. He has been binge drinking 1 quart of vodka for the past two days. Last drink earlier today. He c/o new onset chest pain, lower chest, pressure like, onset at rest when he was sleeping, worse with eating and taking deep breath, lasted hours, no alleviating factors. Chest pain is not exertional. He also has nausea, lower abdominal pain, intermittent, worse with eating lasting for hours. He has diarrhea. Recent hx of black stool last time was yesterday which he had 3-5 x of black stool. He also has headache.   He denies fever, chills, cough, vomiting, heartburn, constipation.  Per patient, hx of intubation for ETOH withdrawal. He denies hx of seizure.    Exam:  /75 mmHg  Pulse 92  Resp 18  Ht 1.829 m (6')  Wt 90.266 kg (199 lb)  BMI 26.98 kg/m2  SpO2 100%    Physical Exam   Constitutional: He is oriented to person, place, and time and well-developed, well-nourished, and in no distress. No distress.   HENT:   Head: Normocephalic and atraumatic.   Denture. Hearing aids   Eyes: EOM are normal. Pupils are equal, round, and reactive to light.   Pulmonary/Chest:   Decreased breath sounds bilaterally   Abdominal: Soft. He exhibits no distension and no mass. There is tenderness. There is no rebound and no guarding.   Lower abdominal tenderness.    Genitourinary: Guaiac positive stool.   Musculoskeletal: He exhibits no edema.   Neurological: He is alert and oriented to person, place, and time. No cranial nerve deficit.   Skin: Skin is warm and dry. No rash noted. He is not diaphoretic. No erythema. No pallor.   Psychiatric: Mood, memory, affect and judgment normal.       Labs:  Recent Results (from the past 24 hour(s))   CBC w/ Differential     Collection Time: 06/04/17  8:12 PM   Result Value Ref Range    WBC 21.4 (H) 4.8 - 10.8 K/uL    RBC 5.68 4.70 - 6.10 M/uL    Hemoglobin 16.5 14.0 - 18.0 g/dL    Hematocrit 48.7 42.0 - 52.0 %    MCV 85.7 81.4 - 97.8 fL    MCH 29.0 27.0 - 33.0 pg    MCHC 33.9 33.7 - 35.3 g/dL    RDW 43.3 35.9 - 50.0 fL    Platelet Count 407 164 - 446 K/uL    MPV 9.7 9.0 - 12.9 fL    Neutrophils-Polys 79.90 (H) 44.00 - 72.00 %    Lymphocytes 14.60 (L) 22.00 - 41.00 %    Monocytes 4.50 0.00 - 13.40 %    Eosinophils 0.00 0.00 - 6.90 %    Basophils 0.40 0.00 - 1.80 %    Immature Granulocytes 0.60 0.00 - 0.90 %    Nucleated RBC 0.00 /100 WBC    Neutrophils (Absolute) 17.08 (H) 1.82 - 7.42 K/uL    Lymphs (Absolute) 3.12 1.00 - 4.80 K/uL    Monos (Absolute) 0.97 (H) 0.00 - 0.85 K/uL    Eos (Absolute) 0.00 0.00 - 0.51 K/uL    Baso (Absolute) 0.08 0.00 - 0.12 K/uL    Immature Granulocytes (abs) 0.13 (H) 0.00 - 0.11 K/uL    NRBC (Absolute) 0.00 K/uL   Complete Metabolic Panel (CMP)    Collection Time: 06/04/17  8:12 PM   Result Value Ref Range    Sodium 138 135 - 145 mmol/L    Potassium 3.7 3.6 - 5.5 mmol/L    Chloride 103 96 - 112 mmol/L    Co2 15 (L) 20 - 33 mmol/L    Anion Gap 20.0 (H) 0.0 - 11.9    Glucose 148 (H) 65 - 99 mg/dL    Bun 22 8 - 22 mg/dL    Creatinine 0.99 0.50 - 1.40 mg/dL    Calcium 9.2 8.5 - 10.5 mg/dL    AST(SGOT) 20 12 - 45 U/L    ALT(SGPT) 15 2 - 50 U/L    Alkaline Phosphatase 68 30 - 99 U/L    Total Bilirubin 0.6 0.1 - 1.5 mg/dL    Albumin 4.2 3.2 - 4.9 g/dL    Total Protein 7.3 6.0 - 8.2 g/dL    Globulin 3.1 1.9 - 3.5 g/dL    A-G Ratio 1.4 g/dL   Troponin STAT    Collection Time: 06/04/17  8:12 PM   Result Value Ref Range    Troponin I <0.01 0.00 - 0.04 ng/mL   DIAGNOSTIC ALCOHOL    Collection Time: 06/04/17  8:12 PM   Result Value Ref Range    Diagnostic Alcohol 0.19 (H) 0.00 g/dL   ESTIMATED GFR    Collection Time: 06/04/17  8:12 PM   Result Value Ref Range    GFR If African American >60 >60 mL/min/1.73 m 2    GFR  If Non African American >60 >60 mL/min/1.73 m 2       DX-CHEST-PORTABLE (1 VIEW)   Final Result      1.  There is minimal perihilar and lower lobe atelectasis.          Assessment and Plan:  # ETOH intoxication: CIWA with ativan prn. Rally bag, multivitamins, thiamine, folate. IV fluid. Fall/seizure/aspiration precaution    # Leukocytosis, nausea, lower abdominal pain, diarrhea, lactic acidosis.   # Diffuse colitis versus diffuse colonic submucosal edema?  # Large hiatal hernia or paraesophageal hernia with possible volvulus component but no evidence of obstruction or dilated stomach.   - no fever/chills, cough, dysuria  - lipase wnl  - abdominal/pelvic CT: large hiatal hernia or paraesophageal hernia with possible volvulus component but no evidence of obstruction or dilated stomach. diffuse colonic submucosal edema possibly related to hypoproteinemia versus diffuse colitis.mild hepatic fatty infiltration.  plan  - IV ceftriaxone and flagyl to cover probable GI source of infection  - PPI  - IV fluid   - trend lactic acid  - stool studies  - blood cultures, procalcitonin pending  - consider GI/surgery consult  if indicated    # Chest pain: trend troponin and EKGs. Aspirin, statin, nitroglycerine prn SL. NPO after midnight. Check AM lipid profile. Consider MPI if indicated. PPI for GERD/hiatal hernia    # FOBT+ in ER, Hx melena, GERD, Lawson's: Vitals/Hb stable, IV PPI , cross and screen, NPO after midnight, 2 large bore IV accesses, IV fluid, trend H&H. Consider GI consult    #AGMA: lactic acidosis, urine drug screen:neg, salicylate level negative and tylenol level negative. IV fluid. Supportive.     #Chronic back pain: tylenol prn. Hold home narcotic for now.  # anxiety: no SI/HI. citalopram    Full code  SCD for DVT prophylaxis    For further details of Assessment & Plan, please refer to Dr. Collazo's H&P.
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
## 2017-06-05 NOTE — DIETARY
NUTRITION SERVICES - Potential newly identified wound. Wound team consult not yet pending, will await wound staging to make recommendations. RD will monitor per dept policy.

## 2017-06-05 NOTE — ED NOTES
Called unit to give report, told Rafa that nurse has not been answering calls.  Collette, RN paged to phone.  Attempted to give Collette, RN report and hospitalist call this RN for pt lab results.

## 2017-06-05 NOTE — H&P
[facility] [place] Internal Medicine Admitting History and Physical    Name Trace Espinosa 1945   Age/Sex 71 y.o. male   MRN [id number]   Code Status Full     After 5PM or if no immediate response to page, please call for cross-coverage  Attending/Team: Mendel/Ganesh Call [phone] to page   1st Call - Day Intern (R1):   Adam 2nd Call - Day Sr. Resident (R2/R3):   John       Chief Complaint:      HPI:  70 yo male with PMH of anxiety, Lawson's esophagus, lumbago, COPD, presented to the ED for acute alcohol intoxication. Patient was a poor historian, and was acutely intoxicated during the interview. Patient was brought in by EMS (called by his wife), after his wife reported that he took her wallet, and went on a 2 day alcohol binge. Patient reported drinking a quart of 80 proof over the last two days (ED physician reported 2 liters, and 2 pints) with last drink early in the afternoon on same day of admission. He reports that he was intubated prior for alcohol withdrawals, but denies any alcohol related seizures. Patient also reported an episode of dull, substernal chest pain which began while he was lying down in the morning, and lasted all day. He reports that his chest pain is worse with touch/respiration/food intake, but is not associated with physical activity. Patient also endorsed a 2 day history of non-radiating,  lower abdominal pain (described as pressure), with multiple episodes of black diarrhea (he admits to iron supplementation). His abdominal pain is described as coming and going, and is worsened by food intake. Patient denied changes in shortness of breath, fevers, chills, dysuria, productive cough, vomiting, hematemesis, bright red stool. Patient also reported that prior to this episode, he was sober for a few weeks. Patient also has a history of chronic back pain for which he takes daily Norco. Patient denied excess use, but patient's wife reports that he took 7 pills, rather than his  usual 2.     Vitals: HR 92, RR 18, /75, 98% on 3L  Labs: WBC 21.4, Hg 16.4, Plts 407, Etoh .19, Bun/Cre 22/.99, Trops .01  Imaging: CXR: minimal perihilar and lower lobe atelectasis     Review of Systems   Constitutional: Negative for fever, chills, weight loss and malaise/fatigue.   Eyes: Negative for blurred vision, double vision and photophobia.   Respiratory: Positive for shortness of breath. Negative for cough, hemoptysis and sputum production.    Cardiovascular: Positive for chest pain. Negative for palpitations, orthopnea, claudication, leg swelling and PND.   Gastrointestinal: Positive for heartburn, nausea, abdominal pain and diarrhea. Negative for vomiting, constipation, blood in stool and melena.   Genitourinary: Negative for dysuria, urgency and frequency.   Musculoskeletal: Positive for back pain. Negative for myalgias and neck pain.   Skin: Negative for itching and rash.   Neurological: Positive for headaches. Negative for dizziness, tingling, tremors, sensory change, speech change, focal weakness, seizures and loss of consciousness.   Psychiatric/Behavioral: Negative for depression, suicidal ideas, hallucinations and substance abuse.             Past Medical History:   Past Medical History   Diagnosis Date   • Back pain    • Acid reflux    • Sleep disorder    • Pleural effusion    • Pneumonia        Past Surgical History:  Past Surgical History   Procedure Laterality Date   • Other orthopedic surgery       knee surgery   • Other       vasectomy       Current Outpatient Medications:  Home Medications     Reviewed by Barbara Hernandez (Pharmacy Tech) on 06/04/17 at 2231  Med List Status: Complete    Medication Last Dose Status    albuterol 108 (90 BASE) MCG/ACT Aero Soln inhalation aerosol 6/2/2017 Active    citalopram (CELEXA) 40 MG Tab 6/2/2017 Active    docusate sodium (COLACE) 100 MG Cap 6/2/2017 Active    esomeprazole (NEXIUM) 40 MG delayed-release capsule 6/2/2017 Active    Ferrous Fumarate  (IRON) 18 MG Tab CR 6/2/2017 Active    gabapentin (NEURONTIN) 300 MG Cap 6/4/2017 Active    hydrocodone/acetaminophen (NORCO)  MG Tab 6/2/2017 Active    magnesium oxide (MAG-OX) 400 MG Tab 6/2/2017 Active    Multiple Vitamins-Minerals (CENTRUM ADULTS PO) 6/2/2017 Active    temazepam (RESTORIL) 15 MG Cap 6/2/2017 Active    thiamine (THIAMINE) 100 MG Tab 6/2/2017 Active                Medication Allergy/Sensitivities:  No Known Allergies      Family History:  No family history on file.    Social History:  Social History     Social History   • Marital Status:      Spouse Name: N/A   • Number of Children: N/A   • Years of Education: N/A     Occupational History   • Not on file.     Social History Main Topics   • Smoking status: Never Smoker    • Smokeless tobacco: Not on file   • Alcohol Use: Yes   • Drug Use: No   • Sexual Activity: Not on file     Other Topics Concern   • Not on file     Social History Narrative   • No narrative on file     Living situation: Lives with wife  PCP : GAYATHRI Han  Denies current tobacco use (heavy smoker but quit 30+ yrs ago),  Drug use  Usually drinks a pint a day with periodic binges, and breaks      Physical Exam     Filed Vitals:    06/04/17 2100 06/04/17 2130 06/04/17 2200 06/04/17 2230   BP: 145/72 133/71 115/80 130/87   Pulse: 89 98 90 95   Resp: 18 18 18 22   Height:       Weight:       SpO2: 98% 99% 98% 99%     Body mass index is 26.98 kg/(m^2).  /87 mmHg  Pulse 95  Resp 22  Ht 1.829 m (6')  Wt 90.266 kg (199 lb)  BMI 26.98 kg/m2  SpO2 99%  O2 therapy: Pulse Oximetry: 99 %, O2 (LPM): 3, O2 Delivery: Nasal Cannula    Physical Exam   Constitutional: He is oriented to person, place, and time and well-developed, well-nourished, and in no distress. No distress.   High anxiety, intoxicated   HENT:   Head: Normocephalic and atraumatic.   Neck: Normal range of motion. Neck supple.   Cardiovascular: Normal rate, regular rhythm, normal heart sounds and  intact distal pulses.  Exam reveals no gallop and no friction rub.    No murmur heard.  Pulmonary/Chest: Effort normal and breath sounds normal. No respiratory distress. He has no wheezes. He has no rales. He exhibits no tenderness.   Abdominal: Soft. Bowel sounds are normal. He exhibits no distension. There is tenderness. There is no rebound and no guarding.   Musculoskeletal: Normal range of motion. He exhibits no edema or tenderness.   Neurological: He is alert and oriented to person, place, and time. No cranial nerve deficit.   Skin: Skin is warm and dry. He is not diaphoretic.   Psychiatric: Mood, memory, affect and judgment normal.   Intoxicated             Data Review       Old Records Request:   Completed  Current Records review and summary: Completed    Lab Data Review:  Recent Results (from the past 24 hour(s))   CBC w/ Differential    Collection Time: 06/04/17  8:12 PM   Result Value Ref Range    WBC 21.4 (H) 4.8 - 10.8 K/uL    RBC 5.68 4.70 - 6.10 M/uL    Hemoglobin 16.5 14.0 - 18.0 g/dL    Hematocrit 48.7 42.0 - 52.0 %    MCV 85.7 81.4 - 97.8 fL    MCH 29.0 27.0 - 33.0 pg    MCHC 33.9 33.7 - 35.3 g/dL    RDW 43.3 35.9 - 50.0 fL    Platelet Count 407 164 - 446 K/uL    MPV 9.7 9.0 - 12.9 fL    Neutrophils-Polys 79.90 (H) 44.00 - 72.00 %    Lymphocytes 14.60 (L) 22.00 - 41.00 %    Monocytes 4.50 0.00 - 13.40 %    Eosinophils 0.00 0.00 - 6.90 %    Basophils 0.40 0.00 - 1.80 %    Immature Granulocytes 0.60 0.00 - 0.90 %    Nucleated RBC 0.00 /100 WBC    Neutrophils (Absolute) 17.08 (H) 1.82 - 7.42 K/uL    Lymphs (Absolute) 3.12 1.00 - 4.80 K/uL    Monos (Absolute) 0.97 (H) 0.00 - 0.85 K/uL    Eos (Absolute) 0.00 0.00 - 0.51 K/uL    Baso (Absolute) 0.08 0.00 - 0.12 K/uL    Immature Granulocytes (abs) 0.13 (H) 0.00 - 0.11 K/uL    NRBC (Absolute) 0.00 K/uL   Complete Metabolic Panel (CMP)    Collection Time: 06/04/17  8:12 PM   Result Value Ref Range    Sodium 138 135 - 145 mmol/L    Potassium 3.7 3.6 - 5.5  mmol/L    Chloride 103 96 - 112 mmol/L    Co2 15 (L) 20 - 33 mmol/L    Anion Gap 20.0 (H) 0.0 - 11.9    Glucose 148 (H) 65 - 99 mg/dL    Bun 22 8 - 22 mg/dL    Creatinine 0.99 0.50 - 1.40 mg/dL    Calcium 9.2 8.5 - 10.5 mg/dL    AST(SGOT) 20 12 - 45 U/L    ALT(SGPT) 15 2 - 50 U/L    Alkaline Phosphatase 68 30 - 99 U/L    Total Bilirubin 0.6 0.1 - 1.5 mg/dL    Albumin 4.2 3.2 - 4.9 g/dL    Total Protein 7.3 6.0 - 8.2 g/dL    Globulin 3.1 1.9 - 3.5 g/dL    A-G Ratio 1.4 g/dL   Troponin STAT    Collection Time: 06/04/17  8:12 PM   Result Value Ref Range    Troponin I <0.01 0.00 - 0.04 ng/mL   DIAGNOSTIC ALCOHOL    Collection Time: 06/04/17  8:12 PM   Result Value Ref Range    Diagnostic Alcohol 0.19 (H) 0.00 g/dL   ESTIMATED GFR    Collection Time: 06/04/17  8:12 PM   Result Value Ref Range    GFR If African American >60 >60 mL/min/1.73 m 2    GFR If Non African American >60 >60 mL/min/1.73 m 2       Imaging/Procedures Review:    ndependant Imaging Review: Completed  DX-CHEST-PORTABLE (1 VIEW)   Final Result      1.  There is minimal perihilar and lower lobe atelectasis.      CT-ABDOMEN-PELVIS WITH & W/O    (Results Pending)            EKG:   EKG Independant Review: Completed  QTc:468, HR: 93, Normal Sinus Rhythm, no ST/T changes     (Yes) Records reviewed and summarized in current documentation             Assessment/Plan     Assessment & plan notes cannot be loaded without a specified hospital service.    # Chest pain:  - Atypical  - Initial EKG showed no concerning ST changs  - Initial Troponin negative  - Heart Score 3  - Trend troponin and EKG's  - Gave one time 325 Aspirin  - Gave one time 40 Atorvastatin  - Nitroglycerine prn   - NPO after midnight, Day team to consider echo/stress test  - Lipid profile pending    # Alcohol intoxication:   - Patient placed on CIWA  - Rally bag, with IV fluids to be started upon completion  - Multivitamins, thiamine, folate  - Patient placed on Fall/seizure/aspiration  precaution    # Leukocytosis:  - No fever/chills, cough, dysuria, or overt symptomatology  - No consolidation seen on CXR  - U/A pending  - No indication for antibiotics at this time  - CTM      # Abdominal Pain  - Likely related to alcohol abuse  - TTP on exam, no guarding, no rigidity  - Pending Lipase, Tylenol level pending, Salicylate level pending  - Pending lactic acid  - Abdominal/pelvic CT showed changes consistent with diffuse colitis-> placed patient on C3 + metronidazole, ordered Cdiff      # FOBT+  - Likely related to upper GI bleed from alcohol  - History of melena, GERD, Lawson's  - Vitals stable  - Hg stable  - IV Pantoprazole (loading 80 mg, than 40mg BID)  - Type and Screen, 2 large bore IV accesses  - NPO after midnight    #AGMA:   - AG 20, low Bicarb  - likely related to alcohol intoxication  - lactic acid pending  - urine drug screen pending  -  IV fluid after completion of    #Chronic back pain:   - Tylenol for pain  - Hold home narcotic for now as patient is acutely intoxicated    # anxiety:  - Denies SI/HI  - Continue citalopram  - Holding temazepam as patient is acutely intoxicated and on CIWA    Anticipated Hospital stay: Observation admit        Quality Measures  EKG reviewed, Radiology images reviewed, Labs reviewed and Medications reviewed  Dnoaldson catheter: No Donaldson        DVT prophylaxis - mechanical: SCDs

## 2017-06-05 NOTE — ASSESSMENT & PLAN NOTE
- WBC on admission 21.4 >> 18 >> 10.4 today  - Etiology does not seem to be infectious >> pt afebrile, w/o other clinical symptoms  - Afebrile, Pro-calcitonin < .05, C. Diff (-)  - Discontinue CTX/Flagyl, 6/5/20117  - Colitis likely related to inflammation 2/2 ETOH abuse

## 2017-06-05 NOTE — ED NOTES
Pt moved to room 24 for safety.  Pt resting in bed.  Less anxious.  VS stable.  NAD noted.  Respirations even and unlabored.  No questions or concerns to address at this time.

## 2017-06-05 NOTE — ASSESSMENT & PLAN NOTE
- FIT test (+) on admission w/ hx of dark tarry stools  - Very likely 2/2 chronic ETOH abuse  - Consulted GI (Dr. Stewart) for further eval >> 6/6 EGD showed no over bleeds  - Plans to f/u with GI in later July, per pt's wife

## 2017-06-05 NOTE — PROGRESS NOTES
Report received as patient arrived to unit via stretcher on tele and oxygen; drowsy but awakened easily, moving all extremities and following simple commands.Monitor shows NSR and patient has 2 patent saline lock in each antecubital site. Patient noted to have bilateral hearing aids, a black duffle bag, red T shirt and sweat pants. Also noted red inner buttocks and right foot 4th toe ingrown nail.

## 2017-06-05 NOTE — PROGRESS NOTES
[facility] [place] Internal Medicine Interval Note    Name Trace Espinosa       1945   Age/Sex 71 y.o. male   MRN [id number]   Code Status FULL     After 5PM or if no immediate response to page, please call for cross-coverage  Attending/Team: Dr. Andersen/Mendel Call [phone] to page   1st Call - Day Intern (R1):   Dr. Walter 2nd Call - Day Sr. Resident (R2/R3):   Dr. Lopez         Chief complaint/ reason for interval visit (Primary Diagnosis)   Diarrhea in setting of acute ETOH intoxication    Interval Problem Daily Status Update    - Alcohol intoxication: ETOH on admission .19, stable, on PO MV/Folate/Thiamine, CIWA  - Hiatal hernia: Stable, non-obstructive, GI consulted (Dr. Stewart)   - Melena: Likely small UGIB 2/2 ETOH gastritis, Hgb 16.5 stable, GI consulted (Dr. Stewart)  - Leukocytosis: 21.4 on admission, improving with IVF, C. diff neg, likely reactive, discont abx  - Diarrhea: C. Diff negative, cont to monitor, IVF; discontinue abx   - Increased anion gap metabolic acidosis: Likely 2/2 ETOH, on IVF, improving  - Anxiety: Stable, cont Citalopram  - Back pain: Stable, cont Norco Q6    Resolved Problems:    Chest pain: Negative cardiac work-up, likely 2/2 GERD vs hiatal hernia    Lower abdominal pain: CT shows diffuse colonic colitis, likely ETOH, C. diff negative      Review of Systems   Constitutional: Negative for fever and chills.   HENT: Negative for congestion and sore throat.    Eyes: Negative for blurred vision, pain and redness.   Respiratory: Negative for cough, shortness of breath and wheezing.    Cardiovascular: Positive for chest pain. Negative for palpitations and leg swelling.        Reproducible CP on palpation   Gastrointestinal: Positive for heartburn, diarrhea and melena. Negative for nausea, vomiting and constipation.   Genitourinary: Negative for dysuria, urgency, frequency and hematuria.   Musculoskeletal: Positive for back pain.        Chronic lower back pain    Skin: Negative for rash.   Neurological: Negative for dizziness, sensory change, focal weakness, seizures and headaches.   Psychiatric/Behavioral: Negative.  Negative for depression and suicidal ideas.       Consultants/Specialty  GI (Dr. Stewart/[facility])    Disposition  Inpatient for acute ETOH intoxication complicated by diarrhea and hiatal hernia seen on CT abd/pel    Quality Measures  Core Measures  EKG/labs reviewed: Yes  Abx: CTX/Flagyl (discontinued 6/5/2017)  DVT prophylaxis: SCD (+FIT, hx of melena)  GI prophylaxis: PO omeprazole  Donaldson: None  Tubes: None  Lines: PIV      Physical Exam       Filed Vitals:    06/05/17 0225 06/05/17 0400 06/05/17 0754 06/05/17 1235   BP:  149/77 134/66 134/67   Pulse: 101 88 74 70   Temp:  37.4 °C (99.4 °F) 36.6 °C (97.9 °F) 36.6 °C (97.9 °F)   Resp: 18 16 19 19   Height:       Weight:       SpO2: 97% 98% 97% 97%     Body mass index is 25.5 kg/(m^2). Weight: 85.3 kg (188 lb 0.8 oz)  Oxygen Therapy:  Pulse Oximetry: 97 %, O2 (LPM): 3, O2 Delivery: Silicone Nasal Cannula    Physical Exam   Constitutional: He is oriented to person, place, and time and well-developed, well-nourished, and in no distress. No distress.   HENT:   Head: Normocephalic and atraumatic.   Eyes: EOM are normal. Pupils are equal, round, and reactive to light.   Neck: Normal range of motion.   Cardiovascular: Normal rate and regular rhythm.    No murmur heard.  Pulmonary/Chest: Effort normal and breath sounds normal. No respiratory distress. He has no wheezes. He exhibits tenderness.   TTP of the anterior chest wall   Abdominal: Soft. Bowel sounds are normal. He exhibits no distension. There is no tenderness. There is no guarding.   Musculoskeletal: Normal range of motion. He exhibits no edema or tenderness.   Neurological: He is alert and oriented to person, place, and time. No cranial nerve deficit. GCS score is 15.   Skin: Skin is warm and dry. He is not diaphoretic. No erythema.         Lab Data  Review:      6/5/2017  1:30 PM    Recent Labs      06/04/17 2012 06/05/17   0001  06/05/17   0036   SODIUM  138  138   --    POTASSIUM  3.7  3.9   --    CHLORIDE  103  104   --    CO2  15*  19*   --    BUN  22  21   --    CREATININE  0.99  0.81   --    MAGNESIUM   --    --   1.9   CALCIUM  9.2  9.2   --        Recent Labs      06/04/17 2012 06/05/17 0001 06/05/17   0036   ALTSGPT  15  16   --    ASTSGOT  20  21   --    ALKPHOSPHAT  68  72   --    TBILIRUBIN  0.6  0.8   --    LIPASE   --    --   20   GLUCOSE  148*  80   --        Recent Labs      06/04/17 2012 06/05/17 0001 06/05/17   0144   RBC  5.68  5.65   --    HEMOGLOBIN  16.5  16.5   --    HEMATOCRIT  48.7  48.6   --    PLATELETCT  407  395   --    PROTHROMBTM   --    --   14.0   INR   --    --   1.05       Recent Labs      06/04/17 2012 06/05/17   0001   WBC  21.4*  18.5*   NEUTSPOLYS  79.90*  73.70*   LYMPHOCYTES  14.60*  19.30*   MONOCYTES  4.50  6.10   EOSINOPHILS  0.00  0.10   BASOPHILS  0.40  0.50   ASTSGOT  20  21   ALTSGPT  15  16   ALKPHOSPHAT  68  72   TBILIRUBIN  0.6  0.8           Assessment/Plan     * Alcohol intoxication  Assessment & Plan  - Long standing 20 year hx of routine binge drinking, +withdrawal, hx of intubation, NO seizures  - Current admission was s/p 2-3 days of heavy vodka abuse, BIB by EMS   - Admission labs: ETOH .49, Lactate 5.5, Utox (-), Na 34, K 3.7, AG 20 >> 15, BUN 22, Crt .99  - Seizure/Aspiration/Fall precauations, Clear liquid diet  - Admit to tele, IVF, CIWA, PO MV/Folate/Thiamine, PO Omeprazole 20mg QD    Hiatal hernia  Assessment & Plan  - Patient presented on admission with non-specific abd pain worsened with laying flat  - CT abd/pel showed large hiatal hernia vs paraesophageal w/ aspect of non-obstructing volvulus  - Consulted GI (Dr. Stewart) for evaluation     Leukocytosis  Assessment & Plan  - WBC on admission 21.4 >> improving, 18 today  - Etiology does not seem to be infectious >> pt afebrile,  w/o other clinical symptoms  - Afebrile, Pro-calcitonin < .05, C. Diff (-)  - Discontinue CTX/Flagyl, 6/5/20117  - Colitis likely related to inflammation 2/2 ETOH abuse    Melena  Assessment & Plan  - FIT test (+) on admission w/ hx of dark tarry stools  - Very likely 2/2 chronic ETOH abuse  - Consulted GI (Dr. Stewart) for further eval >> will likely benefit from EGD    Diarrhea  Assessment & Plan  - Roughly 2-3 days prior to admission, consistent with prior ETOH binge periods  - Afebrile, procalcitonin < .05, C. Diff (-), NO hx of recent hospitalization, or abx use  - Likely non-bacterial and assc with his ETOH abuse  - Discont CTX/Flagyl; cont to closely monitor clinically    Increased anion gap metabolic acidosis  Assessment & Plan  - Anion gap on admission 20 >> 15 today  - Improving with continued IV hydration  - Likely ETOH related >> cont IVF hydration    Anxiety  Assessment & Plan  - Stable; Cont Citalopram; holding Temazepam while patient is on CIWA    Back pain  Assessment & Plan  -Stable; Cont Norco 10/325 Q6 PRN

## 2017-06-06 LAB
ANION GAP SERPL CALC-SCNC: 7 MMOL/L (ref 0–11.9)
BASOPHILS # BLD AUTO: 0.4 % (ref 0–1.8)
BASOPHILS # BLD: 0.04 K/UL (ref 0–0.12)
BUN SERPL-MCNC: 14 MG/DL (ref 8–22)
CALCIUM SERPL-MCNC: 8.4 MG/DL (ref 8.5–10.5)
CHLORIDE SERPL-SCNC: 110 MMOL/L (ref 96–112)
CHOLEST SERPL-MCNC: 198 MG/DL (ref 100–199)
CO2 SERPL-SCNC: 21 MMOL/L (ref 20–33)
CREAT SERPL-MCNC: 0.71 MG/DL (ref 0.5–1.4)
EKG IMPRESSION: NORMAL
EOSINOPHIL # BLD AUTO: 0.02 K/UL (ref 0–0.51)
EOSINOPHIL NFR BLD: 0.2 % (ref 0–6.9)
ERYTHROCYTE [DISTWIDTH] IN BLOOD BY AUTOMATED COUNT: 45.9 FL (ref 35.9–50)
GFR SERPL CREATININE-BSD FRML MDRD: >60 ML/MIN/1.73 M 2
GLUCOSE SERPL-MCNC: 110 MG/DL (ref 65–99)
HCT VFR BLD AUTO: 39.2 % (ref 42–52)
HDLC SERPL-MCNC: 53 MG/DL
HGB BLD-MCNC: 12.8 G/DL (ref 14–18)
IMM GRANULOCYTES # BLD AUTO: 0.04 K/UL (ref 0–0.11)
IMM GRANULOCYTES NFR BLD AUTO: 0.4 % (ref 0–0.9)
LACTOFERRIN STL QL IA: NEGATIVE
LDLC SERPL CALC-MCNC: 112 MG/DL
LYMPHOCYTES # BLD AUTO: 2.53 K/UL (ref 1–4.8)
LYMPHOCYTES NFR BLD: 24.4 % (ref 22–41)
MAGNESIUM SERPL-MCNC: 2 MG/DL (ref 1.5–2.5)
MCH RBC QN AUTO: 29.5 PG (ref 27–33)
MCHC RBC AUTO-ENTMCNC: 33.3 G/DL (ref 33.7–35.3)
MCV RBC AUTO: 88.5 FL (ref 81.4–97.8)
MONOCYTES # BLD AUTO: 0.89 K/UL (ref 0–0.85)
MONOCYTES NFR BLD AUTO: 8.6 % (ref 0–13.4)
NEUTROPHILS # BLD AUTO: 6.86 K/UL (ref 1.82–7.42)
NEUTROPHILS NFR BLD: 66 % (ref 44–72)
NRBC # BLD AUTO: 0 K/UL
NRBC BLD AUTO-RTO: 0 /100 WBC
PLATELET # BLD AUTO: 266 K/UL (ref 164–446)
PMV BLD AUTO: 9.7 FL (ref 9–12.9)
POTASSIUM SERPL-SCNC: 3.8 MMOL/L (ref 3.6–5.5)
RBC # BLD AUTO: 4.34 M/UL (ref 4.7–6.1)
SODIUM SERPL-SCNC: 138 MMOL/L (ref 135–145)
TRIGL SERPL-MCNC: 165 MG/DL (ref 0–149)
WBC # BLD AUTO: 10.4 K/UL (ref 4.8–10.8)

## 2017-06-06 PROCEDURE — 700105 HCHG RX REV CODE 258: Performed by: INTERNAL MEDICINE

## 2017-06-06 PROCEDURE — 160203 HCHG ENDO MINUTES - 1ST 30 MINS LEVEL 4: Performed by: INTERNAL MEDICINE

## 2017-06-06 PROCEDURE — 700111 HCHG RX REV CODE 636 W/ 250 OVERRIDE (IP): Performed by: STUDENT IN AN ORGANIZED HEALTH CARE EDUCATION/TRAINING PROGRAM

## 2017-06-06 PROCEDURE — 99233 SBSQ HOSP IP/OBS HIGH 50: CPT | Mod: GC | Performed by: INTERNAL MEDICINE

## 2017-06-06 PROCEDURE — 700111 HCHG RX REV CODE 636 W/ 250 OVERRIDE (IP)

## 2017-06-06 PROCEDURE — 93005 ELECTROCARDIOGRAM TRACING: CPT | Performed by: STUDENT IN AN ORGANIZED HEALTH CARE EDUCATION/TRAINING PROGRAM

## 2017-06-06 PROCEDURE — 160035 HCHG PACU - 1ST 60 MINS PHASE I: Performed by: INTERNAL MEDICINE

## 2017-06-06 PROCEDURE — A9270 NON-COVERED ITEM OR SERVICE: HCPCS | Performed by: STUDENT IN AN ORGANIZED HEALTH CARE EDUCATION/TRAINING PROGRAM

## 2017-06-06 PROCEDURE — 700102 HCHG RX REV CODE 250 W/ 637 OVERRIDE(OP): Performed by: INTERNAL MEDICINE

## 2017-06-06 PROCEDURE — 160009 HCHG ANES TIME/MIN: Performed by: INTERNAL MEDICINE

## 2017-06-06 PROCEDURE — 93010 ELECTROCARDIOGRAM REPORT: CPT | Performed by: INTERNAL MEDICINE

## 2017-06-06 PROCEDURE — 80048 BASIC METABOLIC PNL TOTAL CA: CPT

## 2017-06-06 PROCEDURE — 700102 HCHG RX REV CODE 250 W/ 637 OVERRIDE(OP): Performed by: STUDENT IN AN ORGANIZED HEALTH CARE EDUCATION/TRAINING PROGRAM

## 2017-06-06 PROCEDURE — 160048 HCHG OR STATISTICAL LEVEL 1-5: Performed by: INTERNAL MEDICINE

## 2017-06-06 PROCEDURE — 83735 ASSAY OF MAGNESIUM: CPT

## 2017-06-06 PROCEDURE — 160002 HCHG RECOVERY MINUTES (STAT): Performed by: INTERNAL MEDICINE

## 2017-06-06 PROCEDURE — 160036 HCHG PACU - EA ADDL 30 MINS PHASE I: Performed by: INTERNAL MEDICINE

## 2017-06-06 PROCEDURE — A9270 NON-COVERED ITEM OR SERVICE: HCPCS | Performed by: INTERNAL MEDICINE

## 2017-06-06 PROCEDURE — 500066 HCHG BITE BLOCK, ECT: Performed by: INTERNAL MEDICINE

## 2017-06-06 PROCEDURE — A9270 NON-COVERED ITEM OR SERVICE: HCPCS | Performed by: PSYCHIATRY & NEUROLOGY

## 2017-06-06 PROCEDURE — 502240 HCHG MISC OR SUPPLY RC 0272: Performed by: INTERNAL MEDICINE

## 2017-06-06 PROCEDURE — 85025 COMPLETE CBC W/AUTO DIFF WBC: CPT

## 2017-06-06 PROCEDURE — 770006 HCHG ROOM/CARE - MED/SURG/GYN SEMI*

## 2017-06-06 PROCEDURE — 36415 COLL VENOUS BLD VENIPUNCTURE: CPT

## 2017-06-06 PROCEDURE — 700102 HCHG RX REV CODE 250 W/ 637 OVERRIDE(OP): Performed by: PSYCHIATRY & NEUROLOGY

## 2017-06-06 PROCEDURE — 80061 LIPID PANEL: CPT

## 2017-06-06 RX ORDER — DIPHENHYDRAMINE HCL 25 MG
50 TABLET ORAL NIGHTLY PRN
Status: DISCONTINUED | OUTPATIENT
Start: 2017-06-06 | End: 2017-06-07 | Stop reason: HOSPADM

## 2017-06-06 RX ORDER — OMEPRAZOLE 20 MG/1
40 CAPSULE, DELAYED RELEASE ORAL DAILY
Status: DISCONTINUED | OUTPATIENT
Start: 2017-06-07 | End: 2017-06-07

## 2017-06-06 RX ORDER — QUETIAPINE FUMARATE 25 MG/1
25 TABLET, FILM COATED ORAL 3 TIMES DAILY
Status: DISCONTINUED | OUTPATIENT
Start: 2017-06-07 | End: 2017-06-07 | Stop reason: HOSPADM

## 2017-06-06 RX ORDER — GABAPENTIN 300 MG/1
300 CAPSULE ORAL 4 TIMES DAILY
Status: DISCONTINUED | OUTPATIENT
Start: 2017-06-06 | End: 2017-06-07 | Stop reason: HOSPADM

## 2017-06-06 RX ORDER — QUETIAPINE FUMARATE 25 MG/1
25 TABLET, FILM COATED ORAL 3 TIMES DAILY
Status: DISCONTINUED | OUTPATIENT
Start: 2017-06-06 | End: 2017-06-06

## 2017-06-06 RX ORDER — QUETIAPINE FUMARATE 25 MG/1
100 TABLET, FILM COATED ORAL EVERY EVENING
Status: DISCONTINUED | OUTPATIENT
Start: 2017-06-06 | End: 2017-06-07 | Stop reason: HOSPADM

## 2017-06-06 RX ADMIN — HYDROCODONE BITARTRATE AND ACETAMINOPHEN 1 TABLET: 10; 325 TABLET ORAL at 10:57

## 2017-06-06 RX ADMIN — GABAPENTIN 300 MG: 300 CAPSULE ORAL at 16:32

## 2017-06-06 RX ADMIN — HYDROCODONE BITARTRATE AND ACETAMINOPHEN 1 TABLET: 10; 325 TABLET ORAL at 23:02

## 2017-06-06 RX ADMIN — GABAPENTIN 300 MG: 300 CAPSULE ORAL at 03:43

## 2017-06-06 RX ADMIN — LORAZEPAM 0.5 MG: 0.5 TABLET ORAL at 13:00

## 2017-06-06 RX ADMIN — LORAZEPAM 0.5 MG: 0.5 TABLET ORAL at 16:32

## 2017-06-06 RX ADMIN — HYDROCODONE BITARTRATE AND ACETAMINOPHEN 1 TABLET: 10; 325 TABLET ORAL at 16:31

## 2017-06-06 RX ADMIN — GABAPENTIN 300 MG: 300 CAPSULE ORAL at 22:58

## 2017-06-06 RX ADMIN — SODIUM CHLORIDE: 9 INJECTION, SOLUTION INTRAVENOUS at 04:38

## 2017-06-06 RX ADMIN — QUETIAPINE FUMARATE 25 MG: 25 TABLET ORAL at 10:57

## 2017-06-06 RX ADMIN — QUETIAPINE FUMARATE 25 MG: 25 TABLET ORAL at 16:31

## 2017-06-06 RX ADMIN — DIPHENHYDRAMINE HCL 50 MG: 25 TABLET ORAL at 22:44

## 2017-06-06 RX ADMIN — HYDROCODONE BITARTRATE AND ACETAMINOPHEN 1 TABLET: 10; 325 TABLET ORAL at 03:39

## 2017-06-06 RX ADMIN — GABAPENTIN 300 MG: 300 CAPSULE ORAL at 10:57

## 2017-06-06 RX ADMIN — QUETIAPINE FUMARATE 100 MG: 25 TABLET ORAL at 20:56

## 2017-06-06 RX ADMIN — ENOXAPARIN SODIUM 40 MG: 100 INJECTION SUBCUTANEOUS at 13:00

## 2017-06-06 RX ADMIN — LORAZEPAM 0.5 MG: 0.5 TABLET ORAL at 06:08

## 2017-06-06 ASSESSMENT — LIFESTYLE VARIABLES
PAROXYSMAL SWEATS: NO SWEAT VISIBLE
HEADACHE, FULLNESS IN HEAD: VERY MILD
AUDITORY DISTURBANCES: NOT PRESENT
PAROXYSMAL SWEATS: NO SWEAT VISIBLE
TOTAL SCORE: 4
NAUSEA AND VOMITING: NO NAUSEA AND NO VOMITING
AUDITORY DISTURBANCES: NOT PRESENT
HEADACHE, FULLNESS IN HEAD: NOT PRESENT
ORIENTATION AND CLOUDING OF SENSORIUM: ORIENTED AND CAN DO SERIAL ADDITIONS
HEADACHE, FULLNESS IN HEAD: VERY MILD
PAROXYSMAL SWEATS: NO SWEAT VISIBLE
ORIENTATION AND CLOUDING OF SENSORIUM: ORIENTED AND CAN DO SERIAL ADDITIONS
TOTAL SCORE: 4
NAUSEA AND VOMITING: NO NAUSEA AND NO VOMITING
VISUAL DISTURBANCES: NOT PRESENT
VISUAL DISTURBANCES: NOT PRESENT
HEADACHE, FULLNESS IN HEAD: MILD
ANXIETY: MILDLY ANXIOUS
HEADACHE, FULLNESS IN HEAD: MILD
TOTAL SCORE: 3
TREMOR: *
AGITATION: SOMEWHAT MORE THAN NORMAL ACTIVITY
ORIENTATION AND CLOUDING OF SENSORIUM: ORIENTED AND CAN DO SERIAL ADDITIONS
ANXIETY: *
ORIENTATION AND CLOUDING OF SENSORIUM: ORIENTED AND CAN DO SERIAL ADDITIONS
ORIENTATION AND CLOUDING OF SENSORIUM: CANNOT DO SERIAL ADDITIONS OR IS UNCERTAIN ABOUT DATE
AUDITORY DISTURBANCES: NOT PRESENT
TOTAL SCORE: 4
TREMOR: TREMOR NOT VISIBLE BUT CAN BE FELT, FINGERTIP TO FINGERTIP
TREMOR: TREMOR NOT VISIBLE BUT CAN BE FELT, FINGERTIP TO FINGERTIP
AUDITORY DISTURBANCES: NOT PRESENT
TOTAL SCORE: 7
ANXIETY: MILDLY ANXIOUS
AGITATION: NORMAL ACTIVITY
NAUSEA AND VOMITING: NO NAUSEA AND NO VOMITING
VISUAL DISTURBANCES: NOT PRESENT
TOTAL SCORE: 4
ANXIETY: *
PAROXYSMAL SWEATS: NO SWEAT VISIBLE
AUDITORY DISTURBANCES: NOT PRESENT
AUDITORY DISTURBANCES: NOT PRESENT
ANXIETY: *
AGITATION: NORMAL ACTIVITY
ORIENTATION AND CLOUDING OF SENSORIUM: ORIENTED AND CAN DO SERIAL ADDITIONS
VISUAL DISTURBANCES: NOT PRESENT
AGITATION: NORMAL ACTIVITY
PAROXYSMAL SWEATS: NO SWEAT VISIBLE
NAUSEA AND VOMITING: NO NAUSEA AND NO VOMITING
VISUAL DISTURBANCES: NOT PRESENT
TREMOR: TREMOR NOT VISIBLE BUT CAN BE FELT, FINGERTIP TO FINGERTIP
AGITATION: NORMAL ACTIVITY
AGITATION: NORMAL ACTIVITY
VISUAL DISTURBANCES: NOT PRESENT
NAUSEA AND VOMITING: NO NAUSEA AND NO VOMITING
TREMOR: TREMOR NOT VISIBLE BUT CAN BE FELT, FINGERTIP TO FINGERTIP
NAUSEA AND VOMITING: NO NAUSEA AND NO VOMITING
PAROXYSMAL SWEATS: NO SWEAT VISIBLE
TREMOR: TREMOR NOT VISIBLE BUT CAN BE FELT, FINGERTIP TO FINGERTIP
HEADACHE, FULLNESS IN HEAD: NOT PRESENT
ANXIETY: *

## 2017-06-06 ASSESSMENT — ENCOUNTER SYMPTOMS
BACK PAIN: 1
DIZZINESS: 0
CONSTIPATION: 0
SHORTNESS OF BREATH: 0
PSYCHIATRIC NEGATIVE: 1
EYE REDNESS: 0
SORE THROAT: 0
SEIZURES: 0
SENSORY CHANGE: 0
DEPRESSION: 0
CHILLS: 0
NAUSEA: 0
VOMITING: 0
PALPITATIONS: 0
FEVER: 0
COUGH: 0
HEARTBURN: 1
EYE PAIN: 0
DIARRHEA: 1
WHEEZING: 0
FOCAL WEAKNESS: 0
HEADACHES: 0
BLURRED VISION: 0

## 2017-06-06 ASSESSMENT — PAIN SCALES - GENERAL
PAINLEVEL_OUTOF10: 8
PAINLEVEL_OUTOF10: 0
PAINLEVEL_OUTOF10: 0

## 2017-06-06 NOTE — PROGRESS NOTES
Bedside report received. Patient A&O X 3, tele- SR,  2L NC,  Voids- urinal/ incontinent , Activity- x1, Diet- NPO. Complains of pain 2/10 medicated per MAR. POC discussed with patient. Pt verbalized understanding. Call light and belongings with in reach.  Bed locked and in lowest position, alarm and fall precautions in place. Possible Scope/per GI

## 2017-06-06 NOTE — CONSULTS
DATE OF SERVICE:  06/05/2017    REFERRING PHYSICIAN:  Dr. Soha Hollingsworth.    CONSULTING PHYSICIAN:  Dejan Stewart MD    CHIEF COMPLAINT:  Abdominal pain, possible melena and abnormal CT scan of the   abdomen and pelvis.    HISTORY OF PRESENT ILLNESS:  This patient is a 71-year-old white male who was   admitted for acute alcohol intoxication.  He has a history of binge drinking,   drinking up to a fifth of vodka a day.  He has over the past couple of weeks,   problems with intermittent episodes of bilateral lower quadrant cramping   abdominal pain that seemed to be brought on by eating.  He has felt nauseated   on occasion, but has not vomited.  He denies any blood in his stool that is   bright red in character, but he has had black tarry bowel movements as an   outpatient, and were found to be Hemoccult positive in the hospital, but the   patient was taking iron supplements prior to admission.  He denies use of   aspirin and nonsteroidals, but is a binge drinker.  There is no family history   of colon cancer or colon polyps.  He does complain of a burning substernal   chest pain, not related to exertion and has a history of Lawson's esophagus,   but denies dysphagia.  His weight has been stable.    ALLERGIES:  No known allergies.    MEDICATIONS:  He is currently on:  1.  Celexa:  2.  Neurontin.  3.  Prilosec.  4.  Of note was the fact he was on iron prior to admission as an outpatient.    PAST MEDICAL HISTORY:  1.  Lawson's esophagus, unknown when his last upper endoscopy was.  2.  Chronic obstructive pulmonary disease.  3.  Back pain.  4.  Gastroesophageal reflux disease.  5.  Knee surgery.  6.  Vasectomy.    SOCIAL HISTORY:  He is .  He is a former cigarette smoker, gave up 30   years ago and a binge drinker, drinking up to a fifth of vodka a day.    FAMILY HISTORY:  Noncontributory.    REVIEW OF SYSTEMS:  MUSCULOSKELETAL:  He has problems with significant low back pain and has been   on opiates in the  past.    PHYSICAL EXAMINATION:  VITAL SIGNS:  Blood pressure is 140/90, pulse is 84, respiratory rate is 18,   the temperature is afebrile.  SKIN:  No stigmata of chronic liver disease.  HEENT:  Head normocephalic.  Eyes are nonicteric.  NECK:  Supple.  LYMPH NODES:  Negative supraclavicular or cervical and axillary.  HEART:  Regular rate and rhythm.  LUNGS:  Clear to auscultation and percussion.  ABDOMEN:  Normoactive bowel sounds.  No masses, fluid wave tenderness,   organomegaly.  EXTREMITIES:  Mild degenerative changes.    LABORATORY DATA:  Stool for C. diff is negative.  Stool for white blood cells   is negative.  INR is 1.05, hematocrit is 48.6, white blood cell count on   admission was 21.4.  It is now 18.5, platelet count 395,000.  Liver function   tests are within normal limits.  Sodium 138, potassium 3.9, creatinine 0.81.    CT scan of the abdomen and pelvis shows a large hiatal hernia with a possible   paraesophageal component with possible element of gastric volvulus.    IMPRESSION AND RECOMMENDATIONS:  1.  Abnormal CT scan.  Given the possibility of gastric volvulus, evaluating   him with an upper endoscopy, it would be prudent to make sure there is no   evidence of ischemia or other abnormality.  2.  Melena.  The interpretation of his black stools in the setting of taking   iron as an outpatient and normal hematocrit makes me wonder if he having a   significant upper gastrointestinal bleed, but he does have Hemoccult positive   stools and we will evaluate this on an upper endoscopy.  3.  Abdominal pain.  This is brought on by eating, it is lower than one would   expect with a gastric volvulus, but we will evaluate him with an upper   endoscopy.  4.  Leukocytosis.  This is normalizing.  He is not on antibiotics and does not   appear toxic.  5.  Alcoholism.  He has been encouraged to stop drinking alcohol.       ____________________________________     MD CORI Armenta / LO    DD:  06/05/2017  14:58:52  DT:  06/05/2017 19:04:16    D#:  [id number]  Job#:  [id number]

## 2017-06-06 NOTE — PROGRESS NOTES
[facility] [place] [facility] [place]    Name Trace Espinosa       1945   Age/Sex 71 y.o. male   MRN [id number]   Code Status FULL     After 5PM or if no immediate response to page, please call for cross-coverage  Attending/Team: Dr. Andersen/Mendel Call [phone] to page   1st Call - Day Intern (R1):   Dr. Walter 2nd Call - Day Sr. Resident (R2/R3):   Dr. Lopez         Chief complaint/ reason for interval visit (Primary Diagnosis)   Diarrhea in setting of acute ETOH intoxication    Interval Problem Daily Status Update    - Alcohol intoxication: ETOH on admission .19, on PO MV/Folate/Thiamine, CIWA, required 9mg Ativan in 24hrs, seems more mood d/o vs ETOH  - Hiatal hernia: Dr. Stewart (GI) EGD  >> Grade D severe reflux esophagitis, several flat red spots in the ulceration but no bleeding.  Unable to assess Lawson's due to degree of inflammation, giant hiatal hernia from 31-45cm, Duodenum WNL >> to f/u as outpatient  - Melena: Likely small UGIB 2/2 ETOH gastritis, Hgb 16.5 stable, GI consulted (Dr. Stewart), cont Omeprazole, can resume Esomeprazole on discharge  - Diarrhea: Improving, C. Diff negative, cont to monitor, IVF; discontinued abx   - Anxiety: Stable, cont Citalopram, psych to eval patient  - Back pain: Stable, cont Norco Q6    Resolved Problems:  - Increased anion gap metabolic acidosis: Likely 2/2 ETOH, on IVF, improving  - Chest pain: Negative cardiac work-up, likely 2/2 GERD vs hiatal hernia  - Lower abdominal pain: CT shows diffuse colonic colitis, likely ETOH, C. diff negative  - Leukocytosis: 21.4 on admission, improving with IVF, C. diff neg, likely reactive, discont abx      Review of Systems   Constitutional: Negative for fever and chills.   HENT: Negative for congestion and sore throat.    Eyes: Negative for blurred vision, pain and redness.   Respiratory: Negative for cough, shortness of breath and wheezing.    Cardiovascular: Positive for chest pain.  Negative for palpitations and leg swelling.        Reproducible CP on palpation   Gastrointestinal: Positive for heartburn, diarrhea and melena. Negative for nausea, vomiting and constipation.   Genitourinary: Negative for dysuria, urgency, frequency and hematuria.   Musculoskeletal: Positive for back pain.        Chronic lower back pain   Skin: Negative for rash.   Neurological: Negative for dizziness, sensory change, focal weakness, seizures and headaches.   Psychiatric/Behavioral: Negative.  Negative for depression and suicidal ideas.       Consultants/Specialty  GI (Dr. Stewart/[facility])    Disposition  Inpatient for acute ETOH intoxication complicated by diarrhea and hiatal hernia seen on CT abd/pel    Quality Measures  Core Measures  EKG/labs reviewed: Yes  Abx: CTX/Flagyl (discontinued 6/5/2017)  DVT prophylaxis: Lovenox  GI prophylaxis: PO omeprazole  Donaldson: None  Tubes: None  Lines: PIV      Physical Exam       Filed Vitals:    06/06/17 1115 06/06/17 1215 06/06/17 1230 06/06/17 1530   BP: 143/70 116/57 116/55 105/56   Pulse: 53 75 73 67   Temp:    36.8 °C (98.2 °F)   Resp: 16 16 16 16   Height:       Weight:       SpO2: 97% 98% 98% 97%     Body mass index is 25.59 kg/(m^2). Weight: 85.6 kg (188 lb 11.4 oz)  Oxygen Therapy:  Pulse Oximetry: 97 %, O2 (LPM): 3, O2 Delivery: Nasal Cannula    Physical Exam   Constitutional: He is oriented to person, place, and time and well-developed, well-nourished, and in no distress. No distress.   HENT:   Head: Normocephalic and atraumatic.   Eyes: EOM are normal. Pupils are equal, round, and reactive to light.   Neck: Normal range of motion.   Cardiovascular: Normal rate and regular rhythm.    No murmur heard.  Pulmonary/Chest: Effort normal and breath sounds normal. No respiratory distress. He has no wheezes. He exhibits tenderness.   TTP of the anterior chest wall   Abdominal: Soft. Bowel sounds are normal. He exhibits no distension. There is no tenderness. There is  no guarding.   Musculoskeletal: Normal range of motion. He exhibits no edema or tenderness.   Neurological: He is alert and oriented to person, place, and time. No cranial nerve deficit. GCS score is 15.   Skin: Skin is warm and dry. He is not diaphoretic. No erythema.         Lab Data Review:      6/5/2017  1:30 PM    Recent Labs      06/04/17 2012 06/05/17 0001 06/05/17   0036  06/06/17   0115   SODIUM  138  138   --   138   POTASSIUM  3.7  3.9   --   3.8   CHLORIDE  103  104   --   110   CO2  15*  19*   --   21   BUN  22  21   --   14   CREATININE  0.99  0.81   --   0.71   MAGNESIUM   --    --   1.9  2.0   CALCIUM  9.2  9.2   --   8.4*       Recent Labs      06/04/17 2012 06/05/17 0001 06/05/17   0036  06/06/17   0115   ALTSGPT  15  16   --    --    ASTSGOT  20  21   --    --    ALKPHOSPHAT  68  72   --    --    TBILIRUBIN  0.6  0.8   --    --    LIPASE   --    --   20   --    GLUCOSE  148*  80   --   110*       Recent Labs      06/04/17 2012 06/05/17 0001 06/05/17   0144  06/06/17   0115   RBC  5.68  5.65   --   4.34*   HEMOGLOBIN  16.5  16.5   --   12.8*   HEMATOCRIT  48.7  48.6   --   39.2*   PLATELETCT  407  395   --   266   PROTHROMBTM   --    --   14.0   --    INR   --    --   1.05   --        Recent Labs      06/04/17 2012 06/05/17   0001  06/06/17   0115   WBC  21.4*  18.5*  10.4   NEUTSPOLYS  79.90*  73.70*  66.00   LYMPHOCYTES  14.60*  19.30*  24.40   MONOCYTES  4.50  6.10  8.60   EOSINOPHILS  0.00  0.10  0.20   BASOPHILS  0.40  0.50  0.40   ASTSGOT  20  21   --    ALTSGPT  15  16   --    ALKPHOSPHAT  68  72   --    TBILIRUBIN  0.6  0.8   --            Assessment/Plan     * Alcohol intoxication (present on admission)  Assessment & Plan  - Long standing 20 year hx of routine binge drinking, +withdrawal, hx of intubation, NO seizures  - Current admission was s/p 2-3 days of heavy vodka abuse, BIB by EMS   - Admission labs: ETOH .49, Lactate 5.5, Utox (-), Na 34, K 3.7, AG 20 >> 15,  BUN 22, Crt .99  - Seizure/Aspiration/Fall precauations, Full liquid diet  - IVF, CIWA, PO MV/Folate/Thiamine, PO Omeprazole 40mg QD    Anxiety (present on admission)  Assessment & Plan  - Appears patient is more anxious as opposed to having true ETOH withdrawals   - Cont Citalopram; holding Temazepam while patient is on CIWA  - Psych (Dr. Santiago) consulted >> pending recs    Hiatal hernia (present on admission)  Assessment & Plan  - Patient presented on admission with non-specific abd pain worsened with laying flat  - CT abd/pel showed large hiatal hernia vs paraesophageal w/ aspect of non-obstructing volvulus  - Consulted GI (Dr. Stewart) >> 6/6 EGD Grade D severe reflux esophagitis from, several flat red spots in the ulceration but no bleeding.  Unable to assess Lawson's due to degree of inflammation, Giant hiatal hernia from 31-45cm, Duodenum WNL.  - No current intervention, per note from GI, pt to f/u as outpt in later July for gastroscopy    Melena (present on admission)  Assessment & Plan  - FIT test (+) on admission w/ hx of dark tarry stools  - Very likely 2/2 chronic ETOH abuse  - Consulted GI (Dr. Stewart) for further eval >> 6/6 EGD showed no over bleeds  - Plans to f/u with GI in later July, per pt's wife    Diarrhea (present on admission)  Assessment & Plan  - Roughly 2-3 days prior to admission, consistent with prior ETOH binge periods  - Afebrile, procalcitonin < .05, C. Diff (-), NO hx of recent hospitalization, or abx use  - Likely non-bacterial and assc with his ETOH abuse  - Discont CTX/Flagyl; cont to closely monitor clinically    Back pain (present on admission)  Assessment & Plan  -Stable; Cont Norco 10/325 Q6 PRN

## 2017-06-06 NOTE — OR SURGEON
Immediate Post-Operative Note      PreOp Diagnosis: Melena. 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     PostOp Diagnosis:   1. Double inlet patch in proximal esophagus. Benign finding.  2. Grade D severe reflux esophagitis from 25-31cm. Several flat red spots in the ulceration but no bleeding.  Unable to assess Lawson's due to degree of inflammation.  3. Giant hiatal hernia from 31-45cm.   4. NL duodenum.     Procedure(s):  GASTROSCOPY - Wound Class: Contaminated    Surgeon(s):  Surinder Mitchell M.D.    Anesthesiologist/Type of Anesthesia:  Anesthesiologist: Hollis Winslow M.D./General    Surgical Staff:  Circulator: Casi Cuevas R.N.  Endoscopy Technician: Nitish Alvarez    Specimen: None    Estimated Blood Loss: None    Findings: None  Plan:  1. BID PPI He can get back on Nexium at discharge. His wife says he's been off meds at home since he started drinking again.  2. Full liquid diet.   3. He had a colonoscopy in 2015 or 2016 and the only finding was diverticulosis so I don't think lower gi bleed is an issue here.  4. He's followed by my group for Lawson's and has EGD scheduled in late July according to his wife.     Complications: None        6/6/2017 9:13 AM Surinder Mitchell

## 2017-06-06 NOTE — PROGRESS NOTES
Pt on CIWA protocol, pt very agitated at times, RA, VSS, tele, voids, tolerated full liq. Continue to monitor behavior and manage pain. IV fluids running

## 2017-06-06 NOTE — OP REPORT
DATE OF SERVICE:  06/06/2017    REFERRING PHYSICIAN:  Mo Andersen MD    PROCEDURE PERFORMED:  Gastroscopy.    INDICATION:  A 71-year-old man with complicated GERD admitted with melena   after about a binge drinking.    ASA:  Class III.    SEDATION:  Propofol sedation.    ANESTHESIOLOGIST:  Hollis Winslow MD    FINDINGS:  1.  Double inlet patch in the proximal esophagus which is a benign finding.  2.  Grade D severe reflux esophagitis from 25-31 cm.  There were several flat   red spots in the ulcerations, but no bleeding.  I was unable to assess for   Lawson's esophagus due to the degree of inflammation and no biopsies were   taken.  3.  Giant hiatal hernia from 31-45 cm.  It appears to be a sliding type hiatal   hernia.  4.  Normal duodenum.    DESCRIPTION OF PROCEDURE:  After informed consent and a timeout, he was given   IV propofol sedation.  Once he was quiet, the Olympus flexible video   gastroscope was passed gently into the esophagus.  In the proximal esophagus,   there was a benign appearing double inlet patch.  The GE junction was located   at 31 cm, and there was confluent reflux esophagitis from about 25-31 cm.    There were several flat red spots, but no visible vessel or active bleeding.    I was unable to assess for Lawson's esophagus because of the severe reflux   esophagitis.    The stomach was entered, insufflated with air.  The diaphragmatic pinch was at   45 cm, and he has almost 15 cm of stomach up in the chest, but he does not   appear to have a paraesophageal hiatal hernia.  The antrum was normal.  The   pylorus was patent.  Duodenal bulb and second portion were normal.    Retroflexed view of the gastric cardia showed a large 14-15 cm giant hiatal   hernia.  There was no ulceration in the stomach.  He tolerated the procedure   well and was sent to recovery room in stable condition.    IMPRESSION:  Complicated reflux disease with severe reflux esophagitis and a   giant hiatal hernia.  The  severe reflux esophagitis and the fact that he has   been off medications while on a drinking binge is probably the source of his   recent melena.    PLAN:  1.  Oral b.i.d. PPI.  He can get back on Nexium at discharge.  2.  Full liquid diet.  3.  He had a colonoscopy in 2015 or 2016 by my group and the only finding was   diverticulosis, so I do not think lower GI bleeding is of an issue now.  4.  He is followed by my group for Lawson's esophagus and has a gastroscopy   scheduled in late July according to his wife.  Biopsies of his Lawson's   esophagus and further radiofrequency ablation can be done at that time as long   as the esophagitis is healed by then.  I discussed today's finding with his   wife.       ____________________________________     MD MONICA SHEFFIELD / NTS    DD:  06/06/2017 09:28:07  DT:  06/06/2017 10:21:37    D#:  [id number]  Job#:  [id number]    cc: KERA HERNANDEZ

## 2017-06-06 NOTE — PROGRESS NOTES
Bedside report received. Patient A&O X 4, tele- SB/SR,  RA,  Voids- urinal, Activity- x1 assist, Diet- cardiac. Complains of pain 0/10 medicated per MAR. POC discussed with patient. Pt verbalized understanding. Call light and belongings with in reach.  Bed locked and in lowest position, alarm and fall precautions in place.

## 2017-06-06 NOTE — PROGRESS NOTES
Report received at bedside from Nicole RN; patient is awake, alert and oriented x 4; asking for his next shot of ativan. [facility] protocol reviewed with patient. Offers no complaints at this time.

## 2017-06-06 NOTE — PROGRESS NOTES
Pt came back from endo, no s/s of distress, aox4, 2L O2, full liq, voids in urinal. Continue to manage pain and ciwa, fluids running

## 2017-06-06 NOTE — PROGRESS NOTES
"@ 0230 patient drowsy and had to be called 4 times and gemntly shaken for CIWA and speech slurry. No medication required. @ 0330 awake to use urinal and state:\" I am hurting although I am sleeping; pulse 56  Sat 97 % on 2 l /60 medicated with norco 10 mg patient can barely keep his eyes open. Will continue to monitor.  "

## 2017-06-06 NOTE — CARE PLAN
Problem: Knowledge Deficit  Goal: Knowledge of disease process/condition, treatment plan, diagnostic tests, and medications will improve  Outcome: PROGRESSING AS EXPECTED  Educate on CIWA protocol    Problem: Pain Management  Goal: Pain level will decrease to patient’s comfort goal  Outcome: PROGRESSING AS EXPECTED  Medicate as needed for pain and assess q 2 hrs

## 2017-06-07 VITALS
DIASTOLIC BLOOD PRESSURE: 72 MMHG | SYSTOLIC BLOOD PRESSURE: 140 MMHG | TEMPERATURE: 99.1 F | HEIGHT: 72 IN | RESPIRATION RATE: 17 BRPM | HEART RATE: 70 BPM | WEIGHT: 200.4 LBS | OXYGEN SATURATION: 93 % | BODY MASS INDEX: 27.14 KG/M2

## 2017-06-07 LAB
ANION GAP SERPL CALC-SCNC: 6 MMOL/L (ref 0–11.9)
BASOPHILS # BLD AUTO: 0.5 % (ref 0–1.8)
BASOPHILS # BLD: 0.05 K/UL (ref 0–0.12)
BUN SERPL-MCNC: 15 MG/DL (ref 8–22)
CALCIUM SERPL-MCNC: 8.2 MG/DL (ref 8.5–10.5)
CHLORIDE SERPL-SCNC: 110 MMOL/L (ref 96–112)
CO2 SERPL-SCNC: 21 MMOL/L (ref 20–33)
CREAT SERPL-MCNC: 0.76 MG/DL (ref 0.5–1.4)
EKG IMPRESSION: NORMAL
EOSINOPHIL # BLD AUTO: 0.05 K/UL (ref 0–0.51)
EOSINOPHIL NFR BLD: 0.5 % (ref 0–6.9)
ERYTHROCYTE [DISTWIDTH] IN BLOOD BY AUTOMATED COUNT: 46.1 FL (ref 35.9–50)
GFR SERPL CREATININE-BSD FRML MDRD: >60 ML/MIN/1.73 M 2
GLUCOSE SERPL-MCNC: 109 MG/DL (ref 65–99)
HCT VFR BLD AUTO: 37.3 % (ref 42–52)
HGB BLD-MCNC: 12.1 G/DL (ref 14–18)
IMM GRANULOCYTES # BLD AUTO: 0.04 K/UL (ref 0–0.11)
IMM GRANULOCYTES NFR BLD AUTO: 0.4 % (ref 0–0.9)
LYMPHOCYTES # BLD AUTO: 2.48 K/UL (ref 1–4.8)
LYMPHOCYTES NFR BLD: 26.4 % (ref 22–41)
MAGNESIUM SERPL-MCNC: 1.7 MG/DL (ref 1.5–2.5)
MCH RBC QN AUTO: 28.9 PG (ref 27–33)
MCHC RBC AUTO-ENTMCNC: 32.4 G/DL (ref 33.7–35.3)
MCV RBC AUTO: 89 FL (ref 81.4–97.8)
MONOCYTES # BLD AUTO: 0.7 K/UL (ref 0–0.85)
MONOCYTES NFR BLD AUTO: 7.4 % (ref 0–13.4)
NEUTROPHILS # BLD AUTO: 6.08 K/UL (ref 1.82–7.42)
NEUTROPHILS NFR BLD: 64.8 % (ref 44–72)
NRBC # BLD AUTO: 0 K/UL
NRBC BLD AUTO-RTO: 0 /100 WBC
PLATELET # BLD AUTO: 271 K/UL (ref 164–446)
PMV BLD AUTO: 9.8 FL (ref 9–12.9)
POTASSIUM SERPL-SCNC: 3.6 MMOL/L (ref 3.6–5.5)
RBC # BLD AUTO: 4.19 M/UL (ref 4.7–6.1)
SODIUM SERPL-SCNC: 137 MMOL/L (ref 135–145)
WBC # BLD AUTO: 9.4 K/UL (ref 4.8–10.8)

## 2017-06-07 PROCEDURE — 700102 HCHG RX REV CODE 250 W/ 637 OVERRIDE(OP): Performed by: PSYCHIATRY & NEUROLOGY

## 2017-06-07 PROCEDURE — A9270 NON-COVERED ITEM OR SERVICE: HCPCS | Performed by: INTERNAL MEDICINE

## 2017-06-07 PROCEDURE — 99239 HOSP IP/OBS DSCHRG MGMT >30: CPT | Mod: GC | Performed by: INTERNAL MEDICINE

## 2017-06-07 PROCEDURE — 700111 HCHG RX REV CODE 636 W/ 250 OVERRIDE (IP): Performed by: STUDENT IN AN ORGANIZED HEALTH CARE EDUCATION/TRAINING PROGRAM

## 2017-06-07 PROCEDURE — 700102 HCHG RX REV CODE 250 W/ 637 OVERRIDE(OP): Performed by: INTERNAL MEDICINE

## 2017-06-07 PROCEDURE — A9270 NON-COVERED ITEM OR SERVICE: HCPCS | Performed by: PSYCHIATRY & NEUROLOGY

## 2017-06-07 PROCEDURE — 80048 BASIC METABOLIC PNL TOTAL CA: CPT

## 2017-06-07 PROCEDURE — 85025 COMPLETE CBC W/AUTO DIFF WBC: CPT

## 2017-06-07 PROCEDURE — 83735 ASSAY OF MAGNESIUM: CPT

## 2017-06-07 PROCEDURE — 36415 COLL VENOUS BLD VENIPUNCTURE: CPT

## 2017-06-07 PROCEDURE — A9270 NON-COVERED ITEM OR SERVICE: HCPCS | Performed by: STUDENT IN AN ORGANIZED HEALTH CARE EDUCATION/TRAINING PROGRAM

## 2017-06-07 PROCEDURE — 700102 HCHG RX REV CODE 250 W/ 637 OVERRIDE(OP): Performed by: STUDENT IN AN ORGANIZED HEALTH CARE EDUCATION/TRAINING PROGRAM

## 2017-06-07 PROCEDURE — 93005 ELECTROCARDIOGRAM TRACING: CPT | Performed by: STUDENT IN AN ORGANIZED HEALTH CARE EDUCATION/TRAINING PROGRAM

## 2017-06-07 PROCEDURE — 700105 HCHG RX REV CODE 258: Performed by: INTERNAL MEDICINE

## 2017-06-07 PROCEDURE — 93010 ELECTROCARDIOGRAM REPORT: CPT | Performed by: INTERNAL MEDICINE

## 2017-06-07 RX ORDER — QUETIAPINE FUMARATE 100 MG/1
100 TABLET, FILM COATED ORAL NIGHTLY PRN
Qty: 30 TAB | Refills: 1 | Status: SHIPPED | OUTPATIENT
Start: 2017-06-07 | End: 2018-07-18

## 2017-06-07 RX ORDER — ESOMEPRAZOLE MAGNESIUM 40 MG/1
40 CAPSULE, DELAYED RELEASE ORAL 2 TIMES DAILY
Qty: 30 CAP | Refills: 2 | Status: SHIPPED | OUTPATIENT
Start: 2017-06-07 | End: 2018-06-11

## 2017-06-07 RX ORDER — OMEPRAZOLE 20 MG/1
40 CAPSULE, DELAYED RELEASE ORAL 2 TIMES DAILY
Status: DISCONTINUED | OUTPATIENT
Start: 2017-06-07 | End: 2017-06-07 | Stop reason: HOSPADM

## 2017-06-07 RX ORDER — QUETIAPINE FUMARATE 25 MG/1
25 TABLET, FILM COATED ORAL 3 TIMES DAILY
Qty: 30 TAB | Refills: 1 | Status: SHIPPED | OUTPATIENT
Start: 2017-06-07 | End: 2018-07-16 | Stop reason: SDUPTHER

## 2017-06-07 RX ADMIN — ENOXAPARIN SODIUM 40 MG: 100 INJECTION SUBCUTANEOUS at 08:37

## 2017-06-07 RX ADMIN — SODIUM CHLORIDE: 9 INJECTION, SOLUTION INTRAVENOUS at 00:30

## 2017-06-07 RX ADMIN — HYDROCODONE BITARTRATE AND ACETAMINOPHEN 1 TABLET: 10; 325 TABLET ORAL at 06:22

## 2017-06-07 RX ADMIN — Medication 100 MG: at 08:36

## 2017-06-07 RX ADMIN — FOLIC ACID 1 MG: 1 TABLET ORAL at 08:36

## 2017-06-07 RX ADMIN — THERA TABS 1 TABLET: TAB at 08:36

## 2017-06-07 RX ADMIN — QUETIAPINE FUMARATE 25 MG: 25 TABLET ORAL at 08:36

## 2017-06-07 RX ADMIN — GABAPENTIN 300 MG: 300 CAPSULE ORAL at 06:22

## 2017-06-07 RX ADMIN — OMEPRAZOLE 40 MG: 20 CAPSULE, DELAYED RELEASE ORAL at 08:36

## 2017-06-07 ASSESSMENT — LIFESTYLE VARIABLES
AGITATION: NORMAL ACTIVITY
VISUAL DISTURBANCES: NOT PRESENT
ORIENTATION AND CLOUDING OF SENSORIUM: ORIENTED AND CAN DO SERIAL ADDITIONS
AUDITORY DISTURBANCES: NOT PRESENT
AUDITORY DISTURBANCES: NOT PRESENT
NAUSEA AND VOMITING: NO NAUSEA AND NO VOMITING
ANXIETY: *
TREMOR: NO TREMOR
VISUAL DISTURBANCES: NOT PRESENT
ORIENTATION AND CLOUDING OF SENSORIUM: ORIENTED AND CAN DO SERIAL ADDITIONS
ANXIETY: NO ANXIETY (AT EASE)
TOTAL SCORE: 6
HEADACHE, FULLNESS IN HEAD: NOT PRESENT
TREMOR: TREMOR NOT VISIBLE BUT CAN BE FELT, FINGERTIP TO FINGERTIP
TOTAL SCORE: 0
PAROXYSMAL SWEATS: NO SWEAT VISIBLE
NAUSEA AND VOMITING: NO NAUSEA AND NO VOMITING
EVER_SMOKED: YES
HEADACHE, FULLNESS IN HEAD: NOT PRESENT
AGITATION: *
PAROXYSMAL SWEATS: NO SWEAT VISIBLE

## 2017-06-07 ASSESSMENT — ENCOUNTER SYMPTOMS
BACK PAIN: 1
CONSTITUTIONAL NEGATIVE: 1
GASTROINTESTINAL NEGATIVE: 1
CARDIOVASCULAR NEGATIVE: 1
RESPIRATORY NEGATIVE: 1

## 2017-06-07 ASSESSMENT — PAIN SCALES - GENERAL: PAINLEVEL_OUTOF10: 0
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
## 2017-06-07 NOTE — PROGRESS NOTES
Pt spoke to MD about wanting to go home tomorrow. Pt was slept most of the day. Continue to manage pain and CIWA. Aox4, 2L NC, SR, tolerates full liq, voids.

## 2017-06-07 NOTE — PROGRESS NOTES
Gastroenterology Progress Note     Author: Surinder Mitchell   Date & Time Created: 6/7/2017 8:28 AM    Interval History:  Problem: complicated GERD with severe reflux esophagitis after recent alcohol binge and giant hiatal hernia.   S: Now on omeprazole BID. Denies heartburn or abdominal pain. Feels he's ready to go home and sounds like he might be going to rehab.   He has an appointment with GI in late July for gastroscopy surveillance of Lawson's. Esophagus was too inflamed at EGD yesterday to evaluate the Lawson's.     Review of Systems:  Review of Systems   Constitutional: Negative.    Respiratory: Negative.    Cardiovascular: Negative.    Gastrointestinal: Negative.    Musculoskeletal: Positive for back pain.       Physical Exam:  Physical Exam   Constitutional: He appears well-developed and well-nourished.   Cardiovascular: Normal rate and regular rhythm.    Pulmonary/Chest: Effort normal and breath sounds normal.   Abdominal: Soft. Bowel sounds are normal. He exhibits no distension. There is no tenderness. There is no rebound and no guarding.   Musculoskeletal: Normal range of motion. He exhibits no tenderness.   Skin: Skin is warm and dry.       Labs:        Invalid input(s): [id number]  Recent Labs      06/04/17 2012 06/05/17   0001  06/05/17   0036   TROPONINI  <0.01  <0.01  <0.01     Recent Labs      06/05/17 0001 06/05/17   0036  06/06/17   0115  06/07/17   0236   SODIUM  138   --   138  137   POTASSIUM  3.9   --   3.8  3.6   CHLORIDE  104   --   110  110   CO2  19*   --   21  21   BUN  21   --   14  15   CREATININE  0.81   --   0.71  0.76   MAGNESIUM   --   1.9  2.0  1.7   CALCIUM  9.2   --   8.4*  8.2*     Recent Labs      06/04/17 2012 06/05/17 0001 06/05/17   0036  06/06/17   0115  06/07/17   0236   ALTSGPT  15  16   --    --    --    ASTSGOT  20  21   --    --    --    ALKPHOSPHAT  68  72   --    --    --    TBILIRUBIN  0.6  0.8   --    --    --    LIPASE   --    --   20   --     --    GLUCOSE  148*  80   --   110*  109*     Recent Labs      17   0144  17   0115  17   0236   RBC  5.65   --   4.34*  4.19*   HEMOGLOBIN  16.5   --   12.8*  12.1*   HEMATOCRIT  48.6   --   39.2*  37.3*   PLATELETCT  395   --   266  271   PROTHROMBTM   --   14.0   --    --    INR   --   1.05   --    --      Recent Labs      17   0115  17   0236   WBC  21.4*  18.5*  10.4  9.4   NEUTSPOLYS  79.90*  73.70*  66.00  64.80   LYMPHOCYTES  14.60*  19.30*  24.40  26.40   MONOCYTES  4.50  6.10  8.60  7.40   EOSINOPHILS  0.00  0.10  0.20  0.50   BASOPHILS  0.40  0.50  0.40  0.50   ASTSGOT  20  21   --    --    ALTSGPT  15  16   --    --    ALKPHOSPHAT  68  72   --    --    TBILIRUBIN  0.6  0.8   --    --      Hemodynamics:  Temp (24hrs), Av.8 °C (98.3 °F), Min:36.2 °C (97.1 °F), Max:37.3 °C (99.1 °F)  Temperature: 37.3 °C (99.1 °F)  Pulse  Av.6  Min: 52  Max: 101Heart Rate (Monitored): (!) 52  Blood Pressure : 140/72 mmHg, NIBP: 135/65 mmHg     Respiratory:    Respiration: 17, Pulse Oximetry: 93 %           Fluids:    Intake/Output Summary (Last 24 hours) at 17 0828  Last data filed at 17 0800   Gross per 24 hour   Intake   2705 ml   Output   1500 ml   Net   1205 ml     Weight: 90.9 kg (200 lb 6.4 oz)  GI/Nutrition:  Orders Placed This Encounter   Procedures   • DIET ORDER     Standing Status: Standing      Number of Occurrences: 1      Standing Expiration Date:      Order Specific Question:  Diet:     Answer:  Regular [1]      Comments:  Advance to regular as tolerated.      Medical Decision Making, by Problem:  Active Hospital Problems    Diagnosis   • *Alcohol intoxication [F10.129]   • Anxiety [F41.9]   • Hiatal hernia [K44.9]   • Melena [K92.1]   • Diarrhea [R19.7]   • Back pain [M54.9]   • Chest pain [R07.9]   Impression:  1. Complicated GERD.  2. Lawson's.  3. Giant hiatal hernia  4. Alcohol abuse  5. Chronic back  pain.     Plan:  1. Continue PPI longterm. He can resume Nexium when he gets home.   2. OK for DC from my perspective. Signing off.     Labs reviewed and Medications reviewed

## 2017-06-07 NOTE — PROGRESS NOTES
Patient visibly anxious and agitated after CNA went into the room. Patient using rude language and raising his voice. This RN attempted to calm patient and reassure him that his needs are being met. At this point the patient continued to be rude and made a racial remark. This RN then left the room and security was sent in at bedside to calm the patient down.

## 2017-06-07 NOTE — PROGRESS NOTES
Pt refused to wait for discharge papers.  Instructed patient that this would be leaving AMA and that the doctor will be with him shortly.  He continued to leave at that moment.  MD notified.Trace Espinosa patient has chosen to leave the hospital against medical advice. He would of been DC'd by MD with in the next 30 minutes yet the patient could not wait any longer.  He claimed it had to do with availability of his ride and him living an hour away.        The attending physician has not discharged the patient. Patient is not a risk to himself or others. I have discussed with the patient the following:  Physician has not determined patient is ready for discharge.      Discharge against medical advice form has been Patient left abruptly - no chance to sign.      Patient is a greater than 60 years old  and a referral to  was made.    Attending physician has been notified.

## 2017-06-07 NOTE — PROGRESS NOTES
Patient resting in bed.  No signs of distress or discomfort.  Most likely going home according to patient.  Bed alarm activated and call light in reach.  Vitals are stable.  Will continue to monitor for any changes.
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
## 2017-06-07 NOTE — DISCHARGE INSTRUCTIONS
Discharge Instructions    Discharged to home by car with relative. Discharged via wheelchair, hospital escort: Yes.  Special equipment needed: Not Applicable    Be sure to schedule a follow-up appointment with your primary care doctor or any specialists as instructed.     Discharge Plan:   Diet Plan: Discussed  Activity Level: Discussed  Confirmed Follow up Appointment: Patient to Call and Schedule Appointment  Confirmed Symptoms Management: Discussed  Medication Reconciliation Updated: Yes  Influenza Vaccine Indication: Patient Refuses    I understand that a diet low in cholesterol, fat, and sodium is recommended for good health. Unless I have been given specific instructions below for another diet, I accept this instruction as my diet prescription.   Other diet: Regular Diet    Special Instructions: None      Alcohol Abuse and Nutrition  Alcohol abuse is any pattern of alcohol consumption that harms your health, relationships, or work. Alcohol abuse can affect how your body breaks down and absorbs nutrients from food by causing your liver to work abnormally. Additionally, many people who abuse alcohol do not eat enough carbohydrates, protein, fat, vitamins, and minerals. This can cause poor nutrition (malnutrition) and a lack of nutrients (nutrient deficiencies), which can lead to further complications.  Nutrients that are commonly lacking (deficient) among people who abuse alcohol include:  · Vitamins.  ¨ Vitamin A. This is stored in your liver. It is important for your vision, metabolism, and ability to fight off infections (immunity).  ¨ B vitamins. These include vitamins such as folate, thiamin, and niacin. These are important in new cell growth and maintenance.  ¨ Vitamin C. This plays an important role in iron absorption, wound healing, and immunity.  ¨ Vitamin D. This is produced by your liver, but you can also get vitamin D from food. Vitamin D is necessary for your body to absorb and use  calcium.  · Minerals.  ¨ Calcium. This is important for your bones and your heart and blood vessel (cardiovascular) function.  ¨ Iron. This is important for blood, muscle, and nervous system functioning.  ¨ Magnesium. This plays an important role in muscle and nerve function, and it helps to control blood sugar and blood pressure.  ¨ Zinc. This is important for the normal function of your nervous system and digestive system (gastrointestinal tract).  Nutrition is an essential component of therapy for alcohol abuse. Your health care provider or dietitian will work with you to design a plan that can help restore nutrients to your body and prevent potential complications.  WHAT IS MY PLAN?  Your dietitian may develop a specific diet plan that is based on your condition and any other complications you may have. A diet plan will commonly include:  · A balanced diet.  ¨ Grains: 6-8 oz per day.  ¨ Vegetables: 2-3 cups per day.  ¨ Fruits: 1-2 cups per day.  ¨ Meat and other protein: 5-6 oz per day.  ¨ Dairy: 2-3 cups per day.  · Vitamin and mineral supplements.  WHAT DO I NEED TO KNOW ABOUT ALCOHOL AND NUTRITION?  · Consume foods that are high in antioxidants, such as grapes, berries, nuts, green tea, and dark green and orange vegetables. This can help to counteract some of the stress that is placed on your liver by consuming alcohol.  · Avoid food and drinks that are high in fat and sugar. Foods such as sugared soft drinks, salty snack foods, and candy contain empty calories. This means that they lack important nutrients such as protein, fiber, and vitamins.  · Eat frequent meals and snacks. Try to eat 5-6 small meals each day.  · Eat a variety of fresh fruits and vegetables each day. This will help you get plenty of water, fiber, and vitamins in your diet.  · Drink plenty of water and other clear fluids. Try to drink at least 48-64 oz (1.5-2 L) of water per day.  · If you are a vegetarian, eat a variety of protein-rich  foods. Pair whole grains with plant-based proteins at meals and snacks to obtain the greatest nutrient benefit from your food. For example, eat rice with beans, put peanut butter on whole-grain toast, or eat oatmeal with sunflower seeds.  · Soak beans and whole grains overnight before cooking. This can help your body to absorb the nutrients more easily.  · Include foods fortified with vitamins and minerals in your diet. Commonly fortified foods include milk, orange juice, cereal, and bread.  · If you are malnourished, your dietitian may recommend a high-protein, high-calorie diet. This may include:  ¨ 2,000-3,000 calories (kilocalories) per day.  ¨  grams of protein per day.  · Your health care provider may recommend a complete nutritional supplement beverage. This can help to restore calories, protein, and vitamins to your body. Depending on your condition, you may be advised to consume this instead of or in addition to meals.  · Limit your intake of caffeine. Replace drinks like coffee and black tea with decaffeinated coffee and herbal tea.  · Eat a variety of foods that are high in omega fatty acids. These include fish, nuts and seeds, and soybeans. These foods may help your liver to recover and may also stabilize your mood.  · Certain medicines may cause changes in your appetite, taste, and weight. Work with your health care provider and dietitian to make any adjustments to your medicines and diet plan.  · Include other healthy lifestyle choices in your daily routine.  ¨ Be physically active.  ¨ Get enough sleep.  ¨ Spend time doing activities that you enjoy.  · If you are unable to take in enough food and calories by mouth, your health care provider may recommend a feeding tube. This is a tube that passes through your nose and throat, directly into your stomach. Nutritional supplement beverages can be given to you through the feeding tube to help you get the nutrients you need.  · Take vitamin or mineral  supplements as recommended by your health care provider.  WHAT FOODS CAN I EAT?  Grains  Enriched pasta. Enriched rice. Fortified whole-grain bread. Fortified whole-grain cereal. Barley. Brown rice. Quinoa. Millet.  Vegetables  All fresh, frozen, and canned vegetables. Spinach. Kale. Artichoke. Carrots. Winter squash and pumpkin. Sweet potatoes. Broccoli. Cabbage. Cucumbers. Tomatoes. Sweet peppers. Green beans. Peas. Corn.  Fruits  All fresh and frozen fruits. Berries. Grapes. [place]. Papaya. Guava. Cherries. Apples. Bananas. Peaches. Plums. Pineapple. Watermelon. Cantaloupe. Oranges. Avocado.  Meats and Other Protein Sources  Beef liver. Lean beef. Pork. Fresh and canned chicken. Fresh fish. Oysters. Sardines. Canned tuna. Shrimp. Eggs with yolks. Nuts and seeds. Peanut butter. Beans and lentils. Soybeans. Tofu.  Dairy  Whole, low-fat, and nonfat milk. Whole, low-fat, and nonfat yogurt. Cottage cheese. Sour cream. Hard and soft cheeses.  Beverages  Water. Herbal tea. Decaffeinated coffee. Decaffeinated green tea. 100% fruit juice. 100% vegetable juice. Instant breakfast shakes.  Condiments  Ketchup. Mayonnaise. Mustard. Salad dressing. Barbecue sauce.  Sweets and Desserts  Sugar-free ice cream. Sugar-free pudding. Sugar-free gelatin.  Fats and Oils  Butter. Vegetable oil, flaxseed oil, olive oil, and walnut oil.  Other  Complete nutrition shakes. Protein bars. Sugar-free gum.  The items listed above may not be a complete list of recommended foods or beverages. Contact your dietitian for more options.  WHAT FOODS ARE NOT RECOMMENDED?  Grains  Sugar-sweetened breakfast cereals. Flavored instant oatmeal. Fried breads.  Vegetables  Breaded or deep-fried vegetables.  Fruits  Dried fruit with added sugar. Candied fruit. Canned fruit in syrup.  Meats and Other Protein Sources  Breaded or deep-fried meats.  Dairy  Flavored milks. Fried cheese curds or fried cheese sticks.  Beverages  Alcohol. Sugar-sweetened soft drinks.  Sugar-sweetened tea. Caffeinated coffee and tea.  Condiments  Sugar. Honey. Agave nectar. Molasses.  Sweets and Desserts  Chocolate. Cake. Cookies. Candy.  Other  Potato chips. Pretzels. Salted nuts. Candied nuts.  The items listed above may not be a complete list of foods and beverages to avoid. Contact your dietitian for more information.     This information is not intended to replace advice given to you by your health care provider. Make sure you discuss any questions you have with your health care provider.     Document Released: 10/12/2006 Document Revised: 01/08/2016 Document Reviewed: 07/21/2015  [employer] Patient Education ©2016 [employer]      Gastrointestinal Bleeding  Gastrointestinal (GI) bleeding means there is bleeding somewhere along the digestive tract, between the mouth and anus.  CAUSES   There are many different problems that can cause GI bleeding. Possible causes include:  · Esophagitis. This is inflammation, irritation, or swelling of the esophagus.  · Hemorrhoids. These are veins that are full of blood (engorged) in the rectum. They cause pain, inflammation, and may bleed.  · Anal fissures. These are areas of painful tearing which may bleed. They are often caused by passing hard stool.  · Diverticulosis. These are pouches that form on the colon over time, with age, and may bleed significantly.  · Diverticulitis. This is inflammation in areas with diverticulosis. It can cause pain, fever, and bloody stools, although bleeding is rare.  · Polyps and cancer. Colon cancer often starts out as precancerous polyps.  · Gastritis and ulcers. Bleeding from the upper gastrointestinal tract (near the stomach) may travel through the intestines and produce black, sometimes tarry, often bad smelling stools. In certain cases, if the bleeding is fast enough, the stools may not be black, but red. This condition may be life-threatening.  SYMPTOMS   · Vomiting bright red blood or material that looks  like coffee grounds.  · Bloody, black, or tarry stools.  DIAGNOSIS   Your caregiver may diagnose your condition by taking your history and performing a physical exam. More tests may be needed, including:  · X-rays and other imaging tests.  · Esophagogastroduodenoscopy (EGD). This test uses a flexible, lighted tube to look at your esophagus, stomach, and small intestine.  · Colonoscopy. This test uses a flexible, lighted tube to look at your colon.  TREATMENT   Treatment depends on the cause of your bleeding.   · For bleeding from the esophagus, stomach, small intestine, or colon, the caregiver doing your EGD or colonoscopy may be able to stop the bleeding as part of the procedure.  · Inflammation or infection of the colon can be treated with medicines.  · Many rectal problems can be treated with creams, suppositories, or warm baths.  · Surgery is sometimes needed.  · Blood transfusions are sometimes needed if you have lost a lot of blood.  If bleeding is slow, you may be allowed to go home. If there is a lot of bleeding, you will need to stay in the hospital for observation.  HOME CARE INSTRUCTIONS   · Take any medicines exactly as prescribed.  · Keep your stools soft by eating foods that are high in fiber. These foods include whole grains, legumes, fruits, and vegetables. Prunes (1 to 3 a day) work well for many people.  · Drink enough fluids to keep your urine clear or pale yellow.  SEEK IMMEDIATE MEDICAL CARE IF:   · Your bleeding increases.  · You feel lightheaded, weak, or you faint.  · You have severe cramps in your back or abdomen.  · You pass large blood clots in your stool.  · Your problems are getting worse.  MAKE SURE YOU:   · Understand these instructions.  · Will watch your condition.  · Will get help right away if you are not doing well or get worse.     This information is not intended to replace advice given to you by your health care provider. Make sure you discuss any questions you have with your  health care provider.     Document Released: 12/15/2001 Document Revised: 12/04/2013 Document Reviewed: 11/26/2012  [employer] Interactive Patient Education ©2016 [employer]      · Is patient discharged on Warfarin / Coumadin?   No     · Is patient Post Blood Transfusion?  Yes  POST BLOOD TRANSFUSION INFORMATION (ADULT)    The purpose of blood transfusion is to correct anemia, low levels of blood clotting factors or to correct acute blood loss.   Blood transfusion is very safe but occasionally unexpected adverse reactions do occur. Most adverse reactions occur during transfusion, within one to two days following transfusion or one to two weeks following transfusion. Some adverse reactions can occur one to six months after transfusion and some even years later.             If any of the symptoms listed below happen to you during your transfusion,                                 please notify your nurse immediately.   · Fever or Chills  · Flushing of the Face  · Hives, rash or itching  · Difficulty in breathing or shortness of breath  · Pain or oozing of blood from the IV needle site  · Low back pain  · Nausea or vomiting  · Weakness or fainting  · Chest pain  · Blood in the urine  · Decreased frequency of urination    The above symptoms may also occur within 24 to 48 after transfusion; if so, notify your physician.     · Yellowing of the skin    This can happen one to six months after transfusion; if so, notify your physician    Depression / Suicide Risk    As you are discharged from this [facility] facility, it is important to learn how to keep safe from harming yourself.    Recognize the warning signs:  · Abrupt changes in personality, positive or negative- including increase in energy   · Giving away possessions  · Change in eating patterns- significant weight changes-  positive or negative  · Change in sleeping patterns- unable to sleep or sleeping all the time   · Unwillingness or inability to  communicate  · Depression  · Unusual sadness, discouragement and loneliness  · Talk of wanting to die  · Neglect of personal appearance   · Rebelliousness- reckless behavior  · Withdrawal from people/activities they love  · Confusion- inability to concentrate     If you or a loved one observes any of these behaviors or has concerns about self-harm, here's what you can do:  · Talk about it- your feelings and reasons for harming yourself  · Remove any means that you might use to hurt yourself (examples: pills, rope, extension cords, firearm)  · Get professional help from the community (Mental Health, Substance Abuse, psychological counseling)  · Do not be alone:Call your Safe Contact- someone whom you trust who will be there for you.  · Call your local CRISIS HOTLINE 782-0564 or 215-701-8520  · Call your local Children's Mobile Crisis Response Team [place] (668) 661-0713 or www.[employer]  · Call the toll free National Suicide Prevention Hotlines   · National Suicide Prevention Lifeline 406-514-DINX (0469)  · National Hope Line Network 800-SUICIDE (636-5962)

## 2017-06-07 NOTE — PROGRESS NOTES
Report received at bedside, assumed care. Pt is awake in bed. A&O x 4. Pt denies pain. No other concerns, complains or distress. Tele box on. Chart reviewed and POC updated with patient. Bed in lowest position and call light within reach.

## 2017-06-08 NOTE — DISCHARGE SUMMARY
"        [facility] Internal Medicine Discharge Summary      Admit Date:  6/4/2017       Discharge Date:  06/07/2107    Service:   [facility] [place]  Attending Physician(s):   Dr Andersen     Senior Resident(s):   Dr Lopez  [place] Resident(s):   Dr Walter      Primary Diagnosis:   Alcohol  Intoxication.    Secondary Diagnoses:                Principal Problem:    Alcohol intoxication POA: Yes  Active Problems:    Anxiety POA: Yes    Hiatal hernia POA: Yes    Melena POA: Yes    Diarrhea POA: Yes    Back pain POA: Yes    Chest pain POA: Unknown  Resolved Problems:    Leukocytosis POA: Yes    Chest pain POA: Yes    Lower abdominal pain POA: Yes    Increased anion gap metabolic acidosis POA: Yes      Hospital Summary (Brief Narrative):       71 y.o. male with PMHx of alcoholism, anxiety, GERD, Lawson's esophagus, COPD, Hx of GI bleed?, chronic back pain who presented with alcohol intoxication of  two days duration.  He had been binge drinking 1 quart of vodka for the two days prior to admission. He also complained of chest pain. had black tarry stool  and was found to have FOBT positive on exam  which was suspected to be an upper GI bleed likely from alcohol. CT of the abdomen and pelvis showed a large paraesophageal hernia.He was worked up for ACS which was negative, started on CIWA for alcohol withdrawal and he had a Gastroscopy by GI.   Gastroscopy showed:\"Grade D severe reflux esophagitis from 25-31cm. Several flat red spots in the ulceration but no bleeding, 2. Giant hiatal hernia\". He was placed on PPI BID.  His CIWA protocol was discontinued because it was more related to his history of anxiety than alcohol withdrawal . Psych was consulted and patient was started on Seroquel 25mg TID and 100mg PRN nightly for sleep. Citalopram was discontinued.  He was discharged home on Seroquel and to continue with Nexuim and asked to follow up with his PCP and scheduled appointment with GI in July.However, patient was " impatient and left without his prescription script. Nurse faxed them to his pharmacy.        Patient /Hospital Summary (Details -- Problem Oriented) :        Alcohol intoxication (present on admission)  Assessment & Plan  - Long standing 20 year hx of routine binge drinking, +withdrawal, hx of intubation, NO seizures  - Current admission was s/p 2-3 days of heavy vodka abuse, BIB by EMS    - Admission labs: ETOH .49, Lactate 5.5, Utox (-), Na 34, K 3.7, AG 20 >> 15, BUN 22, Crt .99  - Seizure/Aspiration/Fall precauations, Full liquid diet  -started  IVF, CIWA, PO MV/Folate/Thiamine, PO Omeprazole 40mg QD.  - CIWA d/cd , pt not in withdrawal.    Anxiety (present on admission)  Assessment & Plan  - Appears patient is more anxious as opposed to having true ETOH withdrawals .  - CIWA d'cd.   - started on Seroquel per Psych recs.    Hiatal hernia (present on admission)  Assessment & Plan  - Patient presented on admission with non-specific abd pain worsened with laying flat  - CT abd/pel showed large hiatal hernia vs paraesophageal w/ aspect of non-obstructing volvulus  - Consulted GI (Dr. Stewart) >> 6/6 EGD Grade D severe reflux esophagitis from, several flat red spots in the ulceration but no bleeding.  Unable to assess Lawson's due to degree of inflammation, Giant hiatal hernia from 31-45cm, Duodenum WNL.   - started On PPI BID   - No current intervention, per note from GI, pt to f/u as outpt in later July for gastroscopy    Melena (present on admission)  Assessment & Plan  - FIT test (+) on admission w/ hx of dark tarry stools  - Very likely 2/2 chronic ETOH abuse  - Consulted GI (Dr. Stewart) for further eval >> 6/6 EGD showed no over bleeds  - Plans to f/u with GI in later July, per pt's wife    Diarrhea (present on admission)  Assessment & Plan  - Roughly 2-3 days prior to admission, consistent with prior ETOH binge periods  - Afebrile, procalcitonin < .05, C. Diff (-), NO hx of recent hospitalization, or abx use  -  Likely non-bacterial and assc with his ETOH abuse  - Discont CTX/Flagyl; cont to closely monitor clinically    Back pain (present on admission)  Assessment & Plan  -Stable; Cont Norco 10/325 Q6 PRN    Consultants:     Psychiatry.( Dr Rios)  GI (Dr Mitchell )    Procedures:        Gastroscopy.     Imaging/ Testing:      CT abdomen.  Chest x ray.      Discharge Medications:         Medication Reconciliation: @[email or web address]@     Medication List      START taking these medications       Instructions    * quetiapine 100 MG Tabs   Last time this was given:  25 mg on 6/7/2017  8:36 AM   Commonly known as:  SEROQUEL    Take 1 Tab by mouth at bedtime as needed.   Dose:  100 mg       * quetiapine 25 MG Tabs   Last time this was given:  25 mg on 6/7/2017  8:36 AM   Commonly known as:  SEROQUEL    Take 1 Tab by mouth 3 times a day.   Dose:  25 mg       * Notice:  This list has 2 medication(s) that are the same as other medications prescribed for you. Read the directions carefully, and ask your doctor or other care provider to review them with you.      CHANGE how you take these medications       Instructions    esomeprazole 40 MG delayed-release capsule   What changed:  when to take this   Commonly known as:  NEXIUM    Take 1 Cap by mouth 2 Times a Day.   Dose:  40 mg         CONTINUE taking these medications       Instructions    albuterol 108 (90 BASE) MCG/ACT Aers inhalation aerosol    Inhale 2 Puffs by mouth every four hours as needed for Shortness of Breath.   Dose:  2 Puff       CENTRUM ADULTS PO   Next Dose Due:  Take tomorrow morning    Take 1 Tab by mouth.   Dose:  1 Tab       docusate sodium 100 MG Caps   Commonly known as:  COLACE   Next Dose Due:  Take tonight at bedtime    Take 100 mg by mouth 2 times a day.   Dose:  100 mg       gabapentin 300 MG Caps   Last time this was given:  300 mg on 6/7/2017  6:22 AM   Commonly known as:  NEURONTIN   Next Dose Due:  Take late this afternoon and bedtime    Take 300 mg by  mouth 4 times a day.   Dose:  300 mg       hydrocodone/acetaminophen  MG Tabs   Last time this was given:  1 Tab on 6/7/2017  6:22 AM   Commonly known as:  NORCO    Take 1-2 Tabs by mouth every 6 hours as needed for Moderate Pain.   Dose:  1-2 Tab       Iron 18 MG Tbcr   Next Dose Due:  Take tomorrow monring    Take 65 mg by mouth every day.   Dose:  65 mg       magnesium oxide 400 MG Tabs   Commonly known as:  MAG-OX   Next Dose Due:  Take tomorrow morning    Take 400 mg by mouth every day.   Dose:  400 mg       thiamine 100 MG Tabs   Last time this was given:  100 mg on 6/7/2017  8:36 AM   Commonly known as:  THIAMINE   Next Dose Due:  Take tomorrow morning    Take 250 mg by mouth every day.   Dose:  250 mg         STOP taking these medications          citalopram 40 MG Tabs   Commonly known as:  CELEXA       temazepam 15 MG Caps   Commonly known as:  RESTORIL               Disposition:   Home     Diet:   Regular diet    Activity:   As tolerated     Instructions:        The patient was instructed to return to the ER in the event of worsening symptoms. I have counseled the patient on the importance of compliance and the patient has agreed to proceed with all medical recommendations and follow up plan indicated above.   The patient understands that all medications come with benefits and risks. Risks may include permanent injury or death and these risks can be minimized with close reassessment and monitoring.       Patient was advised to follow up with his PCP and with the Gastroenterlogist. He has a scheduled appointment with Gastro in July.    Patient was inpatient and did not take his prescription script before leaving. Script was faxed to his pharmacy.       Primary Care Provider:    Michael Winn.  Discharge summary faxed to primary care provider: Deferred  Copy of discharge summary given to the patient: Patient did not wait to take discharge summary and script.    Follow up appointment details :        Advised to F/u with PCP and with Scheduled appointment with Gastroenterologist.    Pending Studies:        None     Time spent on discharge day patient visit, preparing discharge paperwork and arranging for patient follow up.    Summary of follow up issues:   F/U with Gastro for biopsy of the stomach    Discharge Time (Minutes) :    > 45mins      Condition on Discharge    nterval history/exam for day of discharge:    Interval Problem Daily Status Update     - Alcohol intoxication: ETOH on admission .19, on PO MV/Folate/Thiamine, CIWA, required 9mg Ativan in 24hrs, seems more mood d/o vs ETOH  - Hiatal hernia: Dr. Stewart (GI) EGD 6/6 >> Grade D severe reflux esophagitis, several flat red spots in the ulceration but no bleeding.  Unable to assess Lawson's due to degree of inflammation, giant hiatal hernia from 31-45cm, Duodenum WNL >> to f/u as outpatient  - Melena: Likely small UGIB 2/2 ETOH gastritis, Hgb 16.5 stable, GI consulted (Dr. Stewart), cont Omeprazole, can resume Esomeprazole on discharge  - Diarrhea: Improving, C. Diff negative, cont to monitor, IVF; discontinued abx   - Anxiety: Stable, started on seroquel.  - Back pain: Stable, cont Norco Q6    Resolved Problems:  - Increased anion gap metabolic acidosis: Likely 2/2 ETOH, on IVF, improving  - Chest pain: Negative cardiac work-up, likely 2/2 GERD vs hiatal hernia  - Lower abdominal pain: CT shows diffuse colonic colitis, likely ETOH, C. diff negative  - Leukocytosis: 21.4 on admission, improving with IVF, C. diff neg, likely reactive, discont abx        Filed Vitals:    06/06/17 1800 06/06/17 2000 06/07/17 0400 06/07/17 0800   BP:  123/60 129/64 140/72   Pulse:  60 66 70   Temp:  36.9 °C (98.4 °F) 36.8 °C (98.3 °F) 37.3 °C (99.1 °F)   Resp:  18 18 17   Height:       Weight:  90.9 kg (200 lb 6.4 oz)     SpO2: 97% 94% 93% 93%     Weight/BMI: Body mass index is 27.17 kg/(m^2).  Pulse Oximetry: 93 %, O2 (LPM): 0, O2 Delivery: None (Room Air)  Physical Exam    Constitutional: He is oriented to person, place, and time and well-developed, well-nourished, and in no distress. No distress.   HENT:    Head: Normocephalic and atraumatic.   Eyes: EOM are normal. Pupils are equal, round, and reactive to light.   Neck: Normal range of motion.   Cardiovascular: Normal rate and regular rhythm.     No murmur heard.  Pulmonary/Chest: Effort normal and breath sounds normal. No respiratory distress. He has no wheezes.   Abdominal: Soft. Bowel sounds are normal. He exhibits no distension. There is no tenderness. There is no guarding.   Musculoskeletal: Normal range of motion. He exhibits no edema or tenderness.   Neurological: He is alert and oriented to person, place, and time. No cranial nerve deficit. GCS score is 15.   Skin: Skin is warm and dry. He is not diaphoretic. No erythema.         Most Recent Labs:    Lab Results   Component Value Date/Time    WBC 9.4 06/07/2017 02:36 AM    RBC 4.19* 06/07/2017 02:36 AM    HEMOGLOBIN 12.1* 06/07/2017 02:36 AM    HEMATOCRIT 37.3* 06/07/2017 02:36 AM    MCV 89.0 06/07/2017 02:36 AM    MCH 28.9 06/07/2017 02:36 AM    MCHC 32.4* 06/07/2017 02:36 AM    MPV 9.8 06/07/2017 02:36 AM    NEUTROPHILS-POLYS 64.80 06/07/2017 02:36 AM    LYMPHOCYTES 26.40 06/07/2017 02:36 AM    MONOCYTES 7.40 06/07/2017 02:36 AM    EOSINOPHILS 0.50 06/07/2017 02:36 AM    BASOPHILS 0.50 06/07/2017 02:36 AM      Lab Results   Component Value Date/Time    SODIUM 137 06/07/2017 02:36 AM    POTASSIUM 3.6 06/07/2017 02:36 AM    CHLORIDE 110 06/07/2017 02:36 AM    CO2 21 06/07/2017 02:36 AM    GLUCOSE 109* 06/07/2017 02:36 AM    BUN 15 06/07/2017 02:36 AM    CREATININE 0.76 06/07/2017 02:36 AM      Lab Results   Component Value Date/Time    ALT(SGPT) 16 06/05/2017 12:01 AM    AST(SGOT) 21 06/05/2017 12:01 AM    ALKALINE PHOSPHATASE 72 06/05/2017 12:01 AM    TOTAL BILIRUBIN 0.8 06/05/2017 12:01 AM    LIPASE 20 06/05/2017 12:36 AM    ALBUMIN 4.4 06/05/2017 12:01 AM    GLOBULIN  3.3 06/05/2017 12:01 AM    INR 1.05 06/05/2017 01:44 AM     Lab Results   Component Value Date/Time    PT 14.0 06/05/2017 01:44 AM    INR 1.05 06/05/2017 01:44 AM

## 2017-06-10 LAB
BACTERIA BLD CULT: NORMAL
BACTERIA BLD CULT: NORMAL
SIGNIFICANT IND 70042: NORMAL
SIGNIFICANT IND 70042: NORMAL
SITE SITE: NORMAL
SITE SITE: NORMAL
SOURCE SOURCE: NORMAL
SOURCE SOURCE: NORMAL

## (undated) DEVICE — BITEBLOCK ENDOSCOPIC PEDI. - (25/BX)

## (undated) DEVICE — GOWN SURGEONS X-LARGE - DISP. (30/CA)

## (undated) DEVICE — KIT CUSTOM PROCEDURE SINGLE FOR ENDO  (15/CA)

## (undated) DEVICE — FORCEP RADIAL JAW 4 STANDARD CAPACITY W/NEEDLE 240CM (40EA/BX)

## (undated) DEVICE — FILM CASSETTE ENDO

## (undated) DEVICE — CONTAINER, SPECIMEN, STERILE

## (undated) DEVICE — BITE BLOCK ADULT 60FR (100EA/CA)